# Patient Record
Sex: FEMALE | Race: WHITE | NOT HISPANIC OR LATINO | Employment: OTHER | URBAN - METROPOLITAN AREA
[De-identification: names, ages, dates, MRNs, and addresses within clinical notes are randomized per-mention and may not be internally consistent; named-entity substitution may affect disease eponyms.]

---

## 2023-12-29 ENCOUNTER — APPOINTMENT (OUTPATIENT)
Dept: RADIOLOGY | Facility: HOSPITAL | Age: 73
End: 2023-12-29
Payer: MEDICARE

## 2023-12-29 ENCOUNTER — HOSPITAL ENCOUNTER (EMERGENCY)
Facility: HOSPITAL | Age: 73
Discharge: HOME | End: 2023-12-29
Payer: MEDICARE

## 2023-12-29 VITALS
WEIGHT: 94 LBS | DIASTOLIC BLOOD PRESSURE: 80 MMHG | BODY MASS INDEX: 16.66 KG/M2 | HEIGHT: 63 IN | TEMPERATURE: 97.7 F | RESPIRATION RATE: 20 BRPM | OXYGEN SATURATION: 100 % | HEART RATE: 98 BPM | SYSTOLIC BLOOD PRESSURE: 165 MMHG

## 2023-12-29 DIAGNOSIS — T14.8XXA HEMATOMA: ICD-10-CM

## 2023-12-29 DIAGNOSIS — S69.90XA INJURY OF HAND, UNSPECIFIED LATERALITY, INITIAL ENCOUNTER: Primary | ICD-10-CM

## 2023-12-29 PROCEDURE — 99283 EMERGENCY DEPT VISIT LOW MDM: CPT

## 2023-12-29 PROCEDURE — 73130 X-RAY EXAM OF HAND: CPT | Mod: RT

## 2023-12-29 RX ORDER — ACETAMINOPHEN 325 MG/1
975 TABLET ORAL ONCE
Status: COMPLETED | OUTPATIENT
Start: 2023-12-29 | End: 2023-12-29

## 2023-12-29 RX ADMIN — ACETAMINOPHEN 975 MG: 325 TABLET ORAL at 19:52

## 2023-12-29 ASSESSMENT — PAIN DESCRIPTION - FREQUENCY: FREQUENCY: CONSTANT/CONTINUOUS

## 2023-12-29 ASSESSMENT — PAIN DESCRIPTION - ORIENTATION
ORIENTATION: RIGHT
ORIENTATION: RIGHT

## 2023-12-29 ASSESSMENT — PAIN DESCRIPTION - PAIN TYPE
TYPE: ACUTE PAIN
TYPE: ACUTE PAIN

## 2023-12-29 ASSESSMENT — PAIN SCALES - GENERAL
PAINLEVEL_OUTOF10: 9
PAINLEVEL_OUTOF10: 4

## 2023-12-29 ASSESSMENT — COLUMBIA-SUICIDE SEVERITY RATING SCALE - C-SSRS
1. IN THE PAST MONTH, HAVE YOU WISHED YOU WERE DEAD OR WISHED YOU COULD GO TO SLEEP AND NOT WAKE UP?: NO
6. HAVE YOU EVER DONE ANYTHING, STARTED TO DO ANYTHING, OR PREPARED TO DO ANYTHING TO END YOUR LIFE?: NO
2. HAVE YOU ACTUALLY HAD ANY THOUGHTS OF KILLING YOURSELF?: NO

## 2023-12-29 ASSESSMENT — PAIN - FUNCTIONAL ASSESSMENT
PAIN_FUNCTIONAL_ASSESSMENT: 0-10
PAIN_FUNCTIONAL_ASSESSMENT: 0-10

## 2023-12-29 ASSESSMENT — PAIN DESCRIPTION - LOCATION
LOCATION: HAND
LOCATION: ARM

## 2023-12-29 ASSESSMENT — PAIN DESCRIPTION - DESCRIPTORS: DESCRIPTORS: ACHING

## 2023-12-30 NOTE — ED PROVIDER NOTES
HPI   Chief Complaint   Patient presents with   • Hand Injury     Pt hit her hand on the corner of the table around 1400 today. Pt is on eliquis and her hand has been swelling up, very small leak happening.       73-year-old female presented emergency department the chief complaint of pain to the dorsum of her right hand.  She had it on a table about 4 to 5 hours ago.  She is on Eliquis.  Her hand has swelled up and she has a large hematoma on the back of her hand.  She was concerned this may represent a DVT so presented to the emergency department.  She is been compliant with her Eliquis.  She denies chest pain or shortness of breath.  She denies difficulty with range of motion of upper extremity digits.  Tetanus up-to-date.  No other injury or trauma.  No other complaint.                          [place] Coma Scale Score: 15                  Patient History   No past medical history on file.  No past surgical history on file.  No family history on file.  Social History     Tobacco Use   • Smoking status: Not on file   • Smokeless tobacco: Not on file   Substance Use Topics   • Alcohol use: Not on file   • Drug use: Not on file       Physical Exam   ED Triage Vitals [12/29/23 1846]   Temp Heart Rate Resp BP   36.5 °C (97.7 °F) 77 18 (!) 181/98      SpO2 Temp Source Heart Rate Source Patient Position   100 % Temporal Monitor --      BP Location FiO2 (%)     -- --       Physical Exam  Vitals and nursing note reviewed.   Constitutional:       Appearance: Normal appearance.   HENT:      Head: Normocephalic and atraumatic.      Nose: Nose normal.      Mouth/Throat:      Mouth: Mucous membranes are moist.   Cardiovascular:      Rate and Rhythm: Normal rate and regular rhythm.   Pulmonary:      Effort: Pulmonary effort is normal.      Breath sounds: Normal breath sounds.   Abdominal:      General: Abdomen is flat.      Palpations: Abdomen is soft.   Musculoskeletal:         General: Normal range of motion.      Cervical  back: Normal range of motion.      Comments: Patient with full range of motion of upper extremity digits able to flex and extend at level of wrist   Skin:     General: Skin is warm.      Comments: Large hematoma with overlying ecchymosis to the dorsum of right hand   Neurological:      General: No focal deficit present.      Mental Status: She is alert and oriented to person, place, and time.      Comments: Strength and sensation intact with flexion extension of upper extremity digits   Psychiatric:         Mood and Affect: Mood normal.         ED Course & MDM   Diagnoses as of 12/29/23 2030   Injury of hand, unspecified laterality, initial encounter   Hematoma       Medical Decision Making  I have seen and evaluated this patient.  Physician available for consultation.  Vital signs have been reviewed.  All laboratory and diagnostic imaging is reviewed by myself and interpreted by myself unless otherwise stated.  Additionally imaging is interpreted by radiologist.    Patient with a negative x-ray for bony abnormality.  Overall impression is venous rupture with subsequent hematoma formation.  There is no evidence of a compartment syndrome.  Patient has palpable pulses with intact strength and sensation.  Compression dressing with ice is placed with subsequent decrease in size of hematoma.  Patient instructed to keep compressing and icing over the next several days.  Released in good condition with primary follow-up.    XR hand right 3+ views   Final Result    Marked degenerative changes. No acute bony abnormalities. Marked soft    tissue swelling over the dorsum of the hand.                MACRO:    None          Signed by: Lisa Benito 12/29/2023 7:46 PM    Dictation workstation:   [id number]             Procedure  Procedures     Rodney Mcclellan PA-C  12/29/23 2030

## 2024-01-02 PROBLEM — F41.1 GENERALIZED ANXIETY DISORDER: Status: ACTIVE | Noted: 2024-01-02

## 2024-01-02 PROBLEM — I48.91 ATRIAL FIBRILLATION (MULTI): Status: ACTIVE | Noted: 2024-01-02

## 2024-01-02 PROBLEM — F03.90 DEMENTIA (MULTI): Status: ACTIVE | Noted: 2024-01-02

## 2024-01-02 NOTE — PROGRESS NOTES
Subjective   Patient ID:   Mishel Scanlon is a 73 y.o. female who presents for No chief complaint on file..  HPI  New patient here today to establish care with myself.  Previous PCP: [facility]  Last seen:    ED follow up:  Was in the ED on 12/29/23.  ED note reviewed.  Had hurt her right hand on a table.  She is on Eliquis.  X-rays were negative.  Was diagnosed with a hematoma.    A-fib/HTN:  Taking Eliquis, Lisinopril, Metoprolol.  Cardiology?  BP today is  Denies dizziness, headaches.    Anxiety/Depression/Dementia:  Taking Wellbutrin.  Denies SI/HI.    Health maintenance:  Smoking:  Labs: July 2023. DUE for lipid  DEXA (65+, q 2 years): DUE?  Prevnar 13 (65+):  Pneumovax 23 (65+, 1 year after Prev 13):  Influenza:  Zostavax (60+, 1 time, at pharmacy):    Review of Systems  12 point review of systems negative unless stated above in HPI    There were no vitals filed for this visit.    Physical Exam  General: Alert and oriented, well nourished, no acute distress.  Lungs: Clear to auscultation, non-labored respiration.  Heart: Normal rate, regular rhythm, no murmur, gallop or edema.  Neurologic: Awake, alert, and oriented X3, CN II-XII intact.  Psychiatric: Cooperative, appropriate mood and affect.    Assessment/Plan   Diagnoses and all orders for this visit:  Atrial fibrillation, unspecified type (CMS/HCC)  Dementia, unspecified dementia severity, unspecified dementia type, unspecified whether behavioral, psychotic, or mood disturbance or anxiety (CMS/HCC)  Essential hypertension, benign  Generalized anxiety disorder  Injury of right hand, subsequent encounter  Hematoma

## 2024-01-03 ENCOUNTER — OFFICE VISIT (OUTPATIENT)
Dept: PRIMARY CARE | Facility: CLINIC | Age: 74
End: 2024-01-03
Payer: MEDICARE

## 2024-01-03 VITALS
OXYGEN SATURATION: 98 % | SYSTOLIC BLOOD PRESSURE: 110 MMHG | HEIGHT: 64 IN | HEART RATE: 67 BPM | BODY MASS INDEX: 16.05 KG/M2 | WEIGHT: 94 LBS | DIASTOLIC BLOOD PRESSURE: 68 MMHG

## 2024-01-03 DIAGNOSIS — T14.8XXA HEMATOMA: ICD-10-CM

## 2024-01-03 DIAGNOSIS — F41.1 GENERALIZED ANXIETY DISORDER: ICD-10-CM

## 2024-01-03 DIAGNOSIS — I10 ESSENTIAL HYPERTENSION, BENIGN: ICD-10-CM

## 2024-01-03 DIAGNOSIS — I48.91 ATRIAL FIBRILLATION, UNSPECIFIED TYPE (MULTI): Primary | ICD-10-CM

## 2024-01-03 DIAGNOSIS — S69.91XD INJURY OF RIGHT HAND, SUBSEQUENT ENCOUNTER: ICD-10-CM

## 2024-01-03 DIAGNOSIS — F03.90 DEMENTIA, UNSPECIFIED DEMENTIA SEVERITY, UNSPECIFIED DEMENTIA TYPE, UNSPECIFIED WHETHER BEHAVIORAL, PSYCHOTIC, OR MOOD DISTURBANCE OR ANXIETY (MULTI): ICD-10-CM

## 2024-01-03 PROBLEM — I48.20 CHRONIC ATRIAL FIBRILLATION, UNSPECIFIED (MULTI): Status: ACTIVE | Noted: 2023-07-30

## 2024-01-03 PROBLEM — M54.12 RADICULOPATHY, CERVICAL REGION: Status: ACTIVE | Noted: 2017-10-10

## 2024-01-03 PROBLEM — R26.81 UNSTEADINESS ON FEET: Status: ACTIVE | Noted: 2023-07-30

## 2024-01-03 PROBLEM — M54.2 CERVICALGIA: Status: ACTIVE | Noted: 2017-04-14

## 2024-01-03 PROBLEM — R53.1 WEAKNESS: Status: ACTIVE | Noted: 2023-07-30

## 2024-01-03 PROBLEM — R06.00 DYSPNEA: Status: ACTIVE | Noted: 2024-01-03

## 2024-01-03 PROBLEM — I69.351 HEMIPLEGIA AND HEMIPARESIS FOLLOWING CEREBRAL INFARCTION AFFECTING RIGHT DOMINANT SIDE (MULTI): Status: ACTIVE | Noted: 2023-07-30

## 2024-01-03 PROBLEM — R40.1 CLOUDED CONSCIOUSNESS: Status: ACTIVE | Noted: 2024-01-03

## 2024-01-03 PROBLEM — Q82.0 PRIMARY (CONGENITAL) LYMPHEDEMA: Status: ACTIVE | Noted: 2020-06-01

## 2024-01-03 PROBLEM — R26.89 OTHER ABNORMALITIES OF GAIT AND MOBILITY: Status: ACTIVE | Noted: 2023-07-30

## 2024-01-03 PROBLEM — G89.29 OTHER CHRONIC PAIN: Status: ACTIVE | Noted: 2024-01-03

## 2024-01-03 PROBLEM — R07.89 CHEST DISCOMFORT: Status: ACTIVE | Noted: 2024-01-03

## 2024-01-03 PROBLEM — I36.1 NON-RHEUMATIC TRICUSPID VALVE INSUFFICIENCY: Status: ACTIVE | Noted: 2017-04-19

## 2024-01-03 PROBLEM — G56.13 MEDIAN NEUROPATHY OF BOTH UPPER EXTREMITIES: Status: ACTIVE | Noted: 2017-10-10

## 2024-01-03 PROBLEM — F32.9 MAJOR DEPRESSIVE DISORDER, SINGLE EPISODE, UNSPECIFIED: Status: ACTIVE | Noted: 2024-01-03

## 2024-01-03 PROBLEM — R29.6 REPEATED FALLS: Status: ACTIVE | Noted: 2023-07-30

## 2024-01-03 PROBLEM — S61.219A LACERATION OF FINGER: Status: ACTIVE | Noted: 2024-01-03
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
## 2024-01-03 PROBLEM — F41.9 ANXIETY DISORDER, UNSPECIFIED: Status: ACTIVE | Noted: 2023-07-30

## 2024-01-03 PROBLEM — G93.40 ENCEPHALOPATHY: Status: ACTIVE | Noted: 2023-07-30

## 2024-01-03 PROBLEM — M62.81 MUSCLE WEAKNESS (GENERALIZED): Status: ACTIVE | Noted: 2023-07-30

## 2024-01-03 PROBLEM — F41.9 ANXIETY: Status: ACTIVE | Noted: 2024-01-03

## 2024-01-03 PROBLEM — I82.409 DEEP VENOUS THROMBOSIS (MULTI): Status: ACTIVE | Noted: 2024-01-03

## 2024-01-03 PROBLEM — M99.61 OSSEOUS AND SUBLUXATION STENOSIS OF INTERVERTEBRAL FORAMINA OF CERVICAL REGION: Status: ACTIVE | Noted: 2017-10-10

## 2024-01-03 PROBLEM — I69.318 OTHER SYMPTOMS AND SIGNS INVOLVING COGNITIVE FUNCTIONS FOLLOWING CEREBRAL INFARCTION: Status: ACTIVE | Noted: 2023-07-30

## 2024-01-03 PROBLEM — R07.89 ATYPICAL CHEST PAIN: Status: ACTIVE | Noted: 2024-01-03

## 2024-01-03 PROBLEM — F32.A DEPRESSION, UNSPECIFIED: Status: ACTIVE | Noted: 2023-07-30

## 2024-01-03 PROBLEM — L50.9 URTICARIA: Status: ACTIVE | Noted: 2024-01-03

## 2024-01-03 RX ORDER — TRAMADOL HYDROCHLORIDE 50 MG/1
TABLET ORAL
COMMUNITY

## 2024-01-03 RX ORDER — LUTEIN 6 MG
TABLET ORAL
COMMUNITY
Start: 2007-01-30

## 2024-01-03 RX ORDER — UBIDECARENONE 75 MG
1 CAPSULE ORAL
COMMUNITY

## 2024-01-03 RX ORDER — IBUPROFEN 100 MG/5ML
1000 SUSPENSION, ORAL (FINAL DOSE FORM) ORAL DAILY
COMMUNITY

## 2024-01-03 RX ORDER — APIXABAN 5 MG/1
5 TABLET, FILM COATED ORAL 2 TIMES DAILY
COMMUNITY
Start: 2023-09-19

## 2024-01-03 RX ORDER — LISINOPRIL 20 MG/1
20 TABLET ORAL DAILY
COMMUNITY
Start: 2023-08-29 | End: 2024-03-15 | Stop reason: SDUPTHER

## 2024-01-03 RX ORDER — METOPROLOL TARTRATE 50 MG/1
50 TABLET ORAL 2 TIMES DAILY
COMMUNITY
Start: 2023-09-19

## 2024-01-03 RX ORDER — CHOLECALCIFEROL (VITAMIN D3) 50 MCG
2000 TABLET ORAL DAILY
COMMUNITY
Start: 2009-05-04

## 2024-01-03 ASSESSMENT — PAIN SCALES - GENERAL: PAINLEVEL: 1

## 2024-01-03 ASSESSMENT — ENCOUNTER SYMPTOMS
OCCASIONAL FEELINGS OF UNSTEADINESS: 0
LOSS OF SENSATION IN FEET: 0
DEPRESSION: 0

## 2024-01-03 ASSESSMENT — PATIENT HEALTH QUESTIONNAIRE - PHQ9
SUM OF ALL RESPONSES TO PHQ9 QUESTIONS 1 AND 2: 0
2. FEELING DOWN, DEPRESSED OR HOPELESS: NOT AT ALL
1. LITTLE INTEREST OR PLEASURE IN DOING THINGS: NOT AT ALL

## 2024-01-11 ENCOUNTER — LAB (OUTPATIENT)
Dept: LAB | Facility: LAB | Age: 74
End: 2024-01-11
Payer: MEDICARE

## 2024-01-11 DIAGNOSIS — R41.3 OTHER AMNESIA: Primary | ICD-10-CM

## 2024-01-11 LAB
ALBUMIN SERPL-MCNC: 4.2 G/DL (ref 3.5–5)
ALP BLD-CCNC: 86 U/L (ref 35–125)
ALT SERPL-CCNC: 9 U/L (ref 5–40)
AMMONIA PLAS-SCNC: 23 UMOL/L (ref 12–45)
ANION GAP SERPL CALC-SCNC: 10 MMOL/L
AST SERPL-CCNC: 19 U/L (ref 5–40)
BASOPHILS # BLD AUTO: 0.03 X10*3/UL (ref 0–0.1)
BASOPHILS NFR BLD AUTO: 0.8 %
BILIRUB SERPL-MCNC: 0.6 MG/DL (ref 0.1–1.2)
BUN SERPL-MCNC: 14 MG/DL (ref 8–25)
CALCIUM SERPL-MCNC: 10.1 MG/DL (ref 8.5–10.4)
CHLORIDE SERPL-SCNC: 103 MMOL/L (ref 97–107)
CO2 SERPL-SCNC: 29 MMOL/L (ref 24–31)
CREAT SERPL-MCNC: 0.7 MG/DL (ref 0.4–1.6)
EGFRCR SERPLBLD CKD-EPI 2021: 90 ML/MIN/1.73M*2
EOSINOPHIL # BLD AUTO: 0.01 X10*3/UL (ref 0–0.4)
EOSINOPHIL NFR BLD AUTO: 0.3 %
ERYTHROCYTE [DISTWIDTH] IN BLOOD BY AUTOMATED COUNT: 12.6 % (ref 11.5–14.5)
GLUCOSE SERPL-MCNC: 85 MG/DL (ref 65–99)
HCT VFR BLD AUTO: 39.4 % (ref 36–46)
HGB BLD-MCNC: 12.8 G/DL (ref 12–16)
IMM GRANULOCYTES # BLD AUTO: 0.01 X10*3/UL (ref 0–0.5)
IMM GRANULOCYTES NFR BLD AUTO: 0.3 % (ref 0–0.9)
LYMPHOCYTES # BLD AUTO: 0.94 X10*3/UL (ref 0.8–3)
LYMPHOCYTES NFR BLD AUTO: 24.3 %
MCH RBC QN AUTO: 31.7 PG (ref 26–34)
MCHC RBC AUTO-ENTMCNC: 32.5 G/DL (ref 32–36)
MCV RBC AUTO: 98 FL (ref 80–100)
MONOCYTES # BLD AUTO: 0.4 X10*3/UL (ref 0.05–0.8)
MONOCYTES NFR BLD AUTO: 10.3 %
NEUTROPHILS # BLD AUTO: 2.48 X10*3/UL (ref 1.6–5.5)
NEUTROPHILS NFR BLD AUTO: 64 %
NRBC BLD-RTO: 0 /100 WBCS (ref 0–0)
PLATELET # BLD AUTO: 402 X10*3/UL (ref 150–450)
POTASSIUM SERPL-SCNC: 4.3 MMOL/L (ref 3.4–5.1)
PROT SERPL-MCNC: 6.2 G/DL (ref 5.9–7.9)
RBC # BLD AUTO: 4.04 X10*6/UL (ref 4–5.2)
SODIUM SERPL-SCNC: 142 MMOL/L (ref 133–145)
TSH SERPL DL<=0.05 MIU/L-ACNC: 1.28 MIU/L (ref 0.27–4.2)
VIT B12 SERPL-MCNC: >2000 PG/ML (ref 211–946)
WBC # BLD AUTO: 3.9 X10*3/UL (ref 4.4–11.3)

## 2024-01-11 PROCEDURE — 80053 COMPREHEN METABOLIC PANEL: CPT

## 2024-01-11 PROCEDURE — 86318 IA INFECTIOUS AGENT ANTIBODY: CPT

## 2024-01-11 PROCEDURE — 84443 ASSAY THYROID STIM HORMONE: CPT

## 2024-01-11 PROCEDURE — 82607 VITAMIN B-12: CPT

## 2024-01-11 PROCEDURE — 36415 COLL VENOUS BLD VENIPUNCTURE: CPT

## 2024-01-11 PROCEDURE — 85025 COMPLETE CBC W/AUTO DIFF WBC: CPT

## 2024-01-11 PROCEDURE — 82140 ASSAY OF AMMONIA: CPT

## 2024-01-12 LAB — RPR SER QL: NONREACTIVE

## 2024-02-04 ENCOUNTER — HOSPITAL ENCOUNTER (EMERGENCY)
Facility: HOSPITAL | Age: 74
Discharge: HOME | End: 2024-02-04
Attending: EMERGENCY MEDICINE
Payer: MEDICARE

## 2024-02-04 VITALS
TEMPERATURE: 98.8 F | WEIGHT: 124 LBS | DIASTOLIC BLOOD PRESSURE: 88 MMHG | RESPIRATION RATE: 18 BRPM | OXYGEN SATURATION: 99 % | SYSTOLIC BLOOD PRESSURE: 177 MMHG | HEART RATE: 88 BPM | BODY MASS INDEX: 21.17 KG/M2 | HEIGHT: 64 IN

## 2024-02-04 DIAGNOSIS — N39.0 URINARY TRACT INFECTION WITHOUT HEMATURIA, SITE UNSPECIFIED: Primary | ICD-10-CM

## 2024-02-04 LAB
APPEARANCE UR: ABNORMAL
BILIRUB UR STRIP.AUTO-MCNC: NEGATIVE MG/DL
COLOR UR: ABNORMAL
GLUCOSE UR STRIP.AUTO-MCNC: NORMAL MG/DL
KETONES UR STRIP.AUTO-MCNC: NEGATIVE MG/DL
LEUKOCYTE ESTERASE UR QL STRIP.AUTO: ABNORMAL
NITRITE UR QL STRIP.AUTO: NEGATIVE
PH UR STRIP.AUTO: 6.5 [PH]
PROT UR STRIP.AUTO-MCNC: ABNORMAL MG/DL
RBC # UR STRIP.AUTO: ABNORMAL /UL
RBC #/AREA URNS AUTO: >20 /HPF
SP GR UR STRIP.AUTO: 1.02
UROBILINOGEN UR STRIP.AUTO-MCNC: NORMAL MG/DL
WBC #/AREA URNS AUTO: >50 /HPF

## 2024-02-04 PROCEDURE — 81001 URINALYSIS AUTO W/SCOPE: CPT | Performed by: EMERGENCY MEDICINE

## 2024-02-04 PROCEDURE — 2500000001 HC RX 250 WO HCPCS SELF ADMINISTERED DRUGS (ALT 637 FOR MEDICARE OP): Performed by: EMERGENCY MEDICINE

## 2024-02-04 PROCEDURE — 99283 EMERGENCY DEPT VISIT LOW MDM: CPT | Performed by: EMERGENCY MEDICINE

## 2024-02-04 RX ORDER — CEFUROXIME AXETIL 500 MG/1
500 TABLET ORAL 2 TIMES DAILY
Qty: 20 TABLET | Refills: 0 | Status: SHIPPED | OUTPATIENT
Start: 2024-02-04 | End: 2024-02-14

## 2024-02-04 RX ORDER — CEPHALEXIN 500 MG/1
500 CAPSULE ORAL ONCE
Status: COMPLETED | OUTPATIENT
Start: 2024-02-04 | End: 2024-02-04

## 2024-02-04 RX ORDER — PHENAZOPYRIDINE HYDROCHLORIDE 200 MG/1
200 TABLET, FILM COATED ORAL 3 TIMES DAILY
Qty: 6 TABLET | Refills: 0 | Status: SHIPPED | OUTPATIENT
Start: 2024-02-04 | End: 2024-02-06

## 2024-02-04 RX ADMIN — CEPHALEXIN 500 MG: 500 CAPSULE ORAL at 19:08

## 2024-02-04 ASSESSMENT — LIFESTYLE VARIABLES
EVER HAD A DRINK FIRST THING IN THE MORNING TO STEADY YOUR NERVES TO GET RID OF A HANGOVER: NO
HAVE PEOPLE ANNOYED YOU BY CRITICIZING YOUR DRINKING: NO
EVER FELT BAD OR GUILTY ABOUT YOUR DRINKING: NO
HAVE YOU EVER FELT YOU SHOULD CUT DOWN ON YOUR DRINKING: NO

## 2024-02-04 ASSESSMENT — PAIN DESCRIPTION - ONSET: ONSET: ONGOING

## 2024-02-04 ASSESSMENT — PAIN DESCRIPTION - FREQUENCY: FREQUENCY: CONSTANT/CONTINUOUS

## 2024-02-04 ASSESSMENT — COLUMBIA-SUICIDE SEVERITY RATING SCALE - C-SSRS
2. HAVE YOU ACTUALLY HAD ANY THOUGHTS OF KILLING YOURSELF?: NO
1. IN THE PAST MONTH, HAVE YOU WISHED YOU WERE DEAD OR WISHED YOU COULD GO TO SLEEP AND NOT WAKE UP?: NO
6. HAVE YOU EVER DONE ANYTHING, STARTED TO DO ANYTHING, OR PREPARED TO DO ANYTHING TO END YOUR LIFE?: NO

## 2024-02-04 ASSESSMENT — PAIN DESCRIPTION - PROGRESSION: CLINICAL_PROGRESSION: NOT CHANGED

## 2024-02-04 ASSESSMENT — PAIN SCALES - GENERAL: PAINLEVEL_OUTOF10: 10 - WORST POSSIBLE PAIN

## 2024-02-04 ASSESSMENT — PAIN DESCRIPTION - PAIN TYPE: TYPE: ACUTE PAIN

## 2024-02-04 ASSESSMENT — PAIN DESCRIPTION - LOCATION: LOCATION: BACK

## 2024-02-04 ASSESSMENT — PAIN - FUNCTIONAL ASSESSMENT: PAIN_FUNCTIONAL_ASSESSMENT: 0-10

## 2024-02-04 ASSESSMENT — PAIN DESCRIPTION - DESCRIPTORS: DESCRIPTORS: ACHING;THROBBING;SHOOTING

## 2024-02-04 ASSESSMENT — PAIN DESCRIPTION - ORIENTATION: ORIENTATION: LOWER;UPPER

## 2024-02-05 NOTE — ED PROVIDER NOTES
HPI   Chief Complaint   Patient presents with    Blood in Urine     Patient states yesterday she started with urinary frequency with a little blood then today there is more blood.       Patient presents the emergency department today with complaints of having frequency, dysuria, and also urgency.  The patient states that she is seeing some blood in her urine as well.  Patient states that the symptoms been going on for the last 2 days, then getting steadily worse.  The patient is not feeling weak or dizzy.  Patient states there is no abdominal pain.  Patient is not having nausea or vomiting.  The patient states that she is getting tired of going to the bathroom on such a regular basis.  The patient was brought in by her son because she had been complaining about the symptoms that she was having at home.  Patient denies chest pain or shortness of breath.  Patient denies any other associated symptoms      [place] Coma Scale Score: 15                  Patient History   History reviewed. No pertinent past medical history.  History reviewed. No pertinent surgical history.  No family history on file.  Social History     Tobacco Use    Smoking status: Never     Passive exposure: Never    Smokeless tobacco: Never   Vaping Use    Vaping Use: Never used   Substance Use Topics    Alcohol use: Never    Drug use: Never       Physical Exam   ED Triage Vitals   Temperature Heart Rate Respirations BP   02/04/24 1706 02/04/24 1706 02/04/24 1706 02/04/24 1706   37.1 °C (98.8 °F) 97 18 (!) 191/90      Pulse Ox Temp Source Heart Rate Source Patient Position   02/04/24 1706 02/04/24 1706 02/04/24 1917 02/04/24 1706   98 % Tympanic Monitor Sitting      BP Location FiO2 (%)     02/04/24 1706 --     Left arm        Physical Exam  Vitals and nursing note reviewed.   Constitutional:       General: She is not in acute distress.     Appearance: She is well-developed.   HENT:      Head: Normocephalic and atraumatic.   Eyes:       Conjunctiva/sclera: Conjunctivae normal.   Cardiovascular:      Rate and Rhythm: Normal rate and regular rhythm.      Heart sounds: No murmur heard.  Pulmonary:      Effort: Pulmonary effort is normal. No respiratory distress.      Breath sounds: Normal breath sounds.   Abdominal:      Palpations: Abdomen is soft.      Tenderness: There is no abdominal tenderness.   Musculoskeletal:         General: No swelling.      Cervical back: Neck supple.   Skin:     General: Skin is warm and dry.   Neurological:      Mental Status: She is alert.         ED Course & MDM   ED Course as of 02/04/24 2203   Sun Feb 04, 2024 1911 I did go over the test results with the patient, and she is aware the fact that there does appear to be urinary tract infection, the patient will be put on antibiotics, I am going to write for antibiotics for home.  Patient will follow with primary care doctor this week [FR]      ED Course User Index  [FR] Kelby Reyes DO         Diagnoses as of 02/04/24 2203   Urinary tract infection without hematuria, site unspecified       Medical Decision Making  After my initial evaluation, the patient symptoms do seem to be consistent with a urinary tract infection.  The patient will have a urine sent, and I am going to start the patient on Pyridium.        Procedure  Procedures     Kelby Reyes DO  02/04/24 2203

## 2024-03-15 DIAGNOSIS — I10 HYPERTENSION, UNSPECIFIED TYPE: Primary | ICD-10-CM

## 2024-03-15 RX ORDER — LISINOPRIL 20 MG/1
20 TABLET ORAL
Qty: 30 TABLET | Refills: 5 | Status: SHIPPED | OUTPATIENT
Start: 2024-03-15

## 2024-03-19 ENCOUNTER — OFFICE VISIT (OUTPATIENT)
Dept: NEUROSURGERY | Facility: CLINIC | Age: 74
End: 2024-03-19
Payer: MEDICARE

## 2024-03-19 VITALS
DIASTOLIC BLOOD PRESSURE: 86 MMHG | BODY MASS INDEX: 19.63 KG/M2 | HEIGHT: 64 IN | HEART RATE: 72 BPM | SYSTOLIC BLOOD PRESSURE: 140 MMHG | RESPIRATION RATE: 18 BRPM | WEIGHT: 115 LBS

## 2024-03-19 DIAGNOSIS — M54.2 CERVICALGIA: Primary | ICD-10-CM

## 2024-03-19 PROCEDURE — 3077F SYST BP >= 140 MM HG: CPT | Performed by: NEUROLOGICAL SURGERY

## 2024-03-19 PROCEDURE — 99202 OFFICE O/P NEW SF 15 MIN: CPT | Performed by: NEUROLOGICAL SURGERY

## 2024-03-19 PROCEDURE — 1157F ADVNC CARE PLAN IN RCRD: CPT | Performed by: NEUROLOGICAL SURGERY

## 2024-03-19 PROCEDURE — 1125F AMNT PAIN NOTED PAIN PRSNT: CPT | Performed by: NEUROLOGICAL SURGERY

## 2024-03-19 PROCEDURE — 1036F TOBACCO NON-USER: CPT | Performed by: NEUROLOGICAL SURGERY

## 2024-03-19 PROCEDURE — 3079F DIAST BP 80-89 MM HG: CPT | Performed by: NEUROLOGICAL SURGERY

## 2024-03-19 PROCEDURE — 1159F MED LIST DOCD IN RCRD: CPT | Performed by: NEUROLOGICAL SURGERY

## 2024-03-19 ASSESSMENT — ENCOUNTER SYMPTOMS
FREQUENCY: 1
NECK PAIN: 1
CONFUSION: 1
APPETITE CHANGE: 1
DIZZINESS: 1
BRUISES/BLEEDS EASILY: 1
EYES NEGATIVE: 1
ENDOCRINE NEGATIVE: 1
GASTROINTESTINAL NEGATIVE: 1
ALLERGIC/IMMUNOLOGIC NEGATIVE: 1
WEAKNESS: 1
RESPIRATORY NEGATIVE: 1

## 2024-03-19 ASSESSMENT — PAIN SCALES - GENERAL: PAINLEVEL: 10-WORST PAIN EVER

## 2024-03-19 NOTE — PROGRESS NOTES
"Here today with burning pain in the neck and going down the arms to the wrist. This has been for 4 months, Has has PT and injections.    73-year-old woman presents for pain throughout her body.  She had had problems with pain for 20 years.  Over that time, she has tried pain management, physical therapy, acupuncture, and many other treatments.      Review of Systems   Constitutional:  Positive for appetite change.   HENT:  Positive for hearing loss.    Eyes: Negative.    Respiratory: Negative.     Cardiovascular:  Positive for leg swelling.   Gastrointestinal: Negative.    Endocrine: Negative.    Genitourinary:  Positive for frequency and urgency.   Musculoskeletal:  Positive for neck pain.   Skin: Negative.    Allergic/Immunologic: Negative.    Neurological:  Positive for dizziness and weakness.   Hematological:  Bruises/bleeds easily.   Psychiatric/Behavioral:  Positive for confusion.        Visit Vitals  /86   Pulse 72   Resp 18   Ht 1.626 m (5' 4\")   Wt 52.2 kg (115 lb)   BMI 19.74 kg/m²   OB Status Unknown   Smoking Status Never   BSA 1.54 m²           Current Outpatient Medications:     ascorbic acid (Vitamin C) 1,000 mg tablet, Take 1 tablet (1,000 mg) by mouth once daily., Disp: , Rfl:     cholecalciferol (Vitamin D-3) 50 MCG (2000 UT) tablet, Take 1 tablet (2,000 Units) by mouth once daily., Disp: , Rfl:     cyanocobalamin (Vitamin B-12) 500 mcg tablet, Take 1 tablet (500 mcg) by mouth once daily., Disp: , Rfl:     Eliquis 5 mg tablet, Take 1 tablet (5 mg) by mouth twice a day., Disp: , Rfl:     lisinopril 20 mg tablet, TAKE ONE TABLET BY MOUTH EVERY DAY IN THE MORNING, Disp: 30 tablet, Rfl: 5    metoprolol tartrate (Lopressor) 50 mg tablet, Take 1 tablet by mouth 2 times a day., Disp: , Rfl:     traMADol (Ultram) 50 mg tablet, Take 1 tablet by mouth two times a day as needed for pain for up to 30 days. Do not start before December 17, 2023., Disp: , Rfl:     vitamin E acid succinate (vitamin E " succinate) 268 mg (400 unit) tablet, Take by mouth., Disp: , Rfl:       Objective   Neurological Exam    On physical exam, the patient is alert and interactive.  She has a dextro scoliosis.  She has diffuse mild 4+ out of 5 weakness.  There is no evidence of hypertonia or hyperreflexia.    The patient recently underwent an MRI of her cervical spine but did not bring this with her for review.    At this time, is unclear whether or not the patient would benefit from any surgical intervention.  Will need to see the images from her MRI to determine if there is any nerve root or spinal cord compression that explains her symptoms before making any final decisions.  I will contact her once I see the results of her scans.

## 2024-03-29 ENCOUNTER — DOCUMENTATION (OUTPATIENT)
Dept: NEUROSURGERY | Facility: HOSPITAL | Age: 74
End: 2024-03-29
Payer: MEDICARE

## 2024-03-29 NOTE — PROGRESS NOTES
MRI shows multiple areas of moderate stenosis.  This does not explain her whole body pain.  As such, I do not believe she would benefit from surgical decompression.

## 2024-08-31 ENCOUNTER — APPOINTMENT (OUTPATIENT)
Dept: RADIOLOGY | Facility: HOSPITAL | Age: 74
End: 2024-08-31
Payer: MEDICARE

## 2024-08-31 ENCOUNTER — HOSPITAL ENCOUNTER (EMERGENCY)
Facility: HOSPITAL | Age: 74
Discharge: HOME | End: 2024-09-01
Attending: STUDENT IN AN ORGANIZED HEALTH CARE EDUCATION/TRAINING PROGRAM
Payer: MEDICARE

## 2024-08-31 DIAGNOSIS — J90 PLEURAL EFFUSION: Primary | ICD-10-CM

## 2024-08-31 DIAGNOSIS — K59.00 ACUTE CONSTIPATION: ICD-10-CM

## 2024-08-31 DIAGNOSIS — R30.0 DYSURIA: ICD-10-CM

## 2024-08-31 DIAGNOSIS — K56.41 FECAL IMPACTION IN RECTUM (MULTI): ICD-10-CM

## 2024-08-31 LAB
ALBUMIN SERPL-MCNC: 3.7 G/DL (ref 3.5–5)
ALP BLD-CCNC: 102 U/L (ref 35–125)
ALT SERPL-CCNC: 15 U/L (ref 5–40)
ANION GAP SERPL CALC-SCNC: 12 MMOL/L
APPEARANCE UR: CLEAR
AST SERPL-CCNC: 31 U/L (ref 5–40)
BASOPHILS # BLD AUTO: 0.03 X10*3/UL (ref 0–0.1)
BASOPHILS NFR BLD AUTO: 0.9 %
BILIRUB SERPL-MCNC: 0.4 MG/DL (ref 0.1–1.2)
BILIRUB UR STRIP.AUTO-MCNC: NEGATIVE MG/DL
BUN SERPL-MCNC: 14 MG/DL (ref 8–25)
CALCIUM SERPL-MCNC: 9.7 MG/DL (ref 8.5–10.4)
CHLORIDE SERPL-SCNC: 95 MMOL/L (ref 97–107)
CO2 SERPL-SCNC: 25 MMOL/L (ref 24–31)
COLOR UR: ABNORMAL
CREAT SERPL-MCNC: 0.6 MG/DL (ref 0.4–1.6)
EGFRCR SERPLBLD CKD-EPI 2021: >90 ML/MIN/1.73M*2
EOSINOPHIL # BLD AUTO: 0.04 X10*3/UL (ref 0–0.4)
EOSINOPHIL NFR BLD AUTO: 1.3 %
ERYTHROCYTE [DISTWIDTH] IN BLOOD BY AUTOMATED COUNT: 12.8 % (ref 11.5–14.5)
GLUCOSE SERPL-MCNC: 99 MG/DL (ref 65–99)
GLUCOSE UR STRIP.AUTO-MCNC: NORMAL MG/DL
HCT VFR BLD AUTO: 39.5 % (ref 36–46)
HGB BLD-MCNC: 12.8 G/DL (ref 12–16)
IMM GRANULOCYTES # BLD AUTO: 0 X10*3/UL (ref 0–0.5)
IMM GRANULOCYTES NFR BLD AUTO: 0 % (ref 0–0.9)
KETONES UR STRIP.AUTO-MCNC: ABNORMAL MG/DL
LEUKOCYTE ESTERASE UR QL STRIP.AUTO: NEGATIVE
LIPASE SERPL-CCNC: 18 U/L (ref 16–63)
LYMPHOCYTES # BLD AUTO: 1 X10*3/UL (ref 0.8–3)
LYMPHOCYTES NFR BLD AUTO: 31.4 %
MCH RBC QN AUTO: 30.5 PG (ref 26–34)
MCHC RBC AUTO-ENTMCNC: 32.4 G/DL (ref 32–36)
MCV RBC AUTO: 94 FL (ref 80–100)
MONOCYTES # BLD AUTO: 0.55 X10*3/UL (ref 0.05–0.8)
MONOCYTES NFR BLD AUTO: 17.3 %
NEUTROPHILS # BLD AUTO: 1.56 X10*3/UL (ref 1.6–5.5)
NEUTROPHILS NFR BLD AUTO: 49.1 %
NITRITE UR QL STRIP.AUTO: NEGATIVE
NRBC BLD-RTO: 0 /100 WBCS (ref 0–0)
PH UR STRIP.AUTO: 6 [PH]
PLATELET # BLD AUTO: 272 X10*3/UL (ref 150–450)
POTASSIUM SERPL-SCNC: 3.8 MMOL/L (ref 3.4–5.1)
PROT SERPL-MCNC: 6.8 G/DL (ref 5.9–7.9)
PROT UR STRIP.AUTO-MCNC: NEGATIVE MG/DL
RBC # BLD AUTO: 4.2 X10*6/UL (ref 4–5.2)
RBC # UR STRIP.AUTO: NEGATIVE /UL
SARS-COV-2 RNA RESP QL NAA+PROBE: NOT DETECTED
SODIUM SERPL-SCNC: 132 MMOL/L (ref 133–145)
SP GR UR STRIP.AUTO: 1.01
UROBILINOGEN UR STRIP.AUTO-MCNC: NORMAL MG/DL
WBC # BLD AUTO: 3.2 X10*3/UL (ref 4.4–11.3)

## 2024-08-31 PROCEDURE — 74177 CT ABD & PELVIS W/CONTRAST: CPT

## 2024-08-31 PROCEDURE — 99285 EMERGENCY DEPT VISIT HI MDM: CPT | Mod: 25

## 2024-08-31 PROCEDURE — 80053 COMPREHEN METABOLIC PANEL: CPT | Performed by: PHYSICIAN ASSISTANT

## 2024-08-31 PROCEDURE — 83690 ASSAY OF LIPASE: CPT | Performed by: PHYSICIAN ASSISTANT

## 2024-08-31 PROCEDURE — 2550000001 HC RX 255 CONTRASTS

## 2024-08-31 PROCEDURE — 96374 THER/PROPH/DIAG INJ IV PUSH: CPT | Mod: 59

## 2024-08-31 PROCEDURE — 81003 URINALYSIS AUTO W/O SCOPE: CPT | Performed by: PHYSICIAN ASSISTANT

## 2024-08-31 PROCEDURE — 71250 CT THORAX DX C-: CPT

## 2024-08-31 PROCEDURE — 71250 CT THORAX DX C-: CPT | Performed by: RADIOLOGY

## 2024-08-31 PROCEDURE — 87635 SARS-COV-2 COVID-19 AMP PRB: CPT

## 2024-08-31 PROCEDURE — 2500000004 HC RX 250 GENERAL PHARMACY W/ HCPCS (ALT 636 FOR OP/ED): Performed by: PHYSICIAN ASSISTANT

## 2024-08-31 PROCEDURE — 74177 CT ABD & PELVIS W/CONTRAST: CPT | Performed by: RADIOLOGY

## 2024-08-31 PROCEDURE — 36415 COLL VENOUS BLD VENIPUNCTURE: CPT | Performed by: PHYSICIAN ASSISTANT

## 2024-08-31 PROCEDURE — 96361 HYDRATE IV INFUSION ADD-ON: CPT

## 2024-08-31 PROCEDURE — 85025 COMPLETE CBC W/AUTO DIFF WBC: CPT | Performed by: PHYSICIAN ASSISTANT

## 2024-08-31 PROCEDURE — 2500000004 HC RX 250 GENERAL PHARMACY W/ HCPCS (ALT 636 FOR OP/ED)

## 2024-08-31 RX ORDER — ONDANSETRON HYDROCHLORIDE 2 MG/ML
4 INJECTION, SOLUTION INTRAVENOUS ONCE
Status: COMPLETED | OUTPATIENT
Start: 2024-08-31 | End: 2024-08-31

## 2024-08-31 RX ORDER — ACETAMINOPHEN 325 MG/1
975 TABLET ORAL ONCE
Status: COMPLETED | OUTPATIENT
Start: 2024-08-31 | End: 2024-08-31

## 2024-08-31 RX ADMIN — ACETAMINOPHEN 975 MG: 325 TABLET ORAL at 17:10

## 2024-08-31 RX ADMIN — ONDANSETRON 4 MG: 2 INJECTION INTRAMUSCULAR; INTRAVENOUS at 17:25

## 2024-08-31 RX ADMIN — IOHEXOL 75 ML: 350 INJECTION, SOLUTION INTRAVENOUS at 17:28

## 2024-08-31 RX ADMIN — SODIUM CHLORIDE 1000 ML: 900 INJECTION, SOLUTION INTRAVENOUS at 17:25

## 2024-08-31 ASSESSMENT — COLUMBIA-SUICIDE SEVERITY RATING SCALE - C-SSRS
1. IN THE PAST MONTH, HAVE YOU WISHED YOU WERE DEAD OR WISHED YOU COULD GO TO SLEEP AND NOT WAKE UP?: NO
2. HAVE YOU ACTUALLY HAD ANY THOUGHTS OF KILLING YOURSELF?: NO
6. HAVE YOU EVER DONE ANYTHING, STARTED TO DO ANYTHING, OR PREPARED TO DO ANYTHING TO END YOUR LIFE?: NO

## 2024-08-31 ASSESSMENT — PAIN SCALES - GENERAL
PAINLEVEL_OUTOF10: 7
PAINLEVEL_OUTOF10: 6

## 2024-08-31 ASSESSMENT — LIFESTYLE VARIABLES
TOTAL SCORE: 0
HAVE PEOPLE ANNOYED YOU BY CRITICIZING YOUR DRINKING: NO
HAVE YOU EVER FELT YOU SHOULD CUT DOWN ON YOUR DRINKING: NO
EVER FELT BAD OR GUILTY ABOUT YOUR DRINKING: NO
EVER HAD A DRINK FIRST THING IN THE MORNING TO STEADY YOUR NERVES TO GET RID OF A HANGOVER: NO

## 2024-08-31 ASSESSMENT — PAIN - FUNCTIONAL ASSESSMENT: PAIN_FUNCTIONAL_ASSESSMENT: 0-10

## 2024-08-31 ASSESSMENT — PAIN DESCRIPTION - LOCATION: LOCATION: BACK

## 2024-08-31 NOTE — ED PROVIDER NOTES
HPI   Chief Complaint   Patient presents with    Difficulty Urinating       Patient is a 74-year-old female who presents emergency department for evaluation of urinary complaints and some generalized fatigue.  Patient states that about 4 days ago she start developing symptoms of urinary frequency, dysuria and feelings of incomplete emptying of bladder.  She went to an urgent care and was prescribed Keflex for urinary tract infection.  She since starting to take the medication she has had not had any significant improvement of her symptoms.  She states last night she had some shaking and felt feverish and chilled.  She is felt rundown.  Intermittently she will feel somewhat confused, but has no confusion or altered mental status at this time.  She presents with neighbor who checks on her frequently who states that she is currently at her mental baseline.  She denies any difficulty breathing or chest pain.  She denies any lightheadedness or dizziness.  She notes that she is also been dealing with some constipation lately and is has not had a full bowel movement in the last week.  She has had some intermittent abdominal pain and cramping because of this.  She states that she is relatively healthy individual and only takes medications for high blood pressure.      History provided by:  Patient   used: No            Patient History   Past Medical History:   Diagnosis Date    Breast cancer (Multi)     Neck pain      Past Surgical History:   Procedure Laterality Date    BREAST SURGERY      KIDNEY SURGERY       Family History   Problem Relation Name Age of Onset    Breast cancer Mother      Heart disease Father       Social History     Tobacco Use    Smoking status: Never     Passive exposure: Never    Smokeless tobacco: Never   Vaping Use    Vaping status: Never Used   Substance Use Topics    Alcohol use: Never    Drug use: Never       Physical Exam   ED Triage Vitals [08/31/24 1555]   Temperature Heart  Rate Respirations BP   37.1 °C (98.8 °F) 87 16 (!) 164/110      Pulse Ox Temp Source Heart Rate Source Patient Position   96 % Temporal Monitor --      BP Location FiO2 (%)     -- --       Physical Exam  Constitutional:       Appearance: Normal appearance.   Eyes:      Extraocular Movements: Extraocular movements intact.      Pupils: Pupils are equal, round, and reactive to light.   Cardiovascular:      Rate and Rhythm: Normal rate and regular rhythm.   Pulmonary:      Effort: Pulmonary effort is normal.      Breath sounds: Normal breath sounds.   Abdominal:      General: Abdomen is flat.      Palpations: Abdomen is soft.      Tenderness: There is no abdominal tenderness.   Musculoskeletal:         General: Normal range of motion.   Skin:     General: Skin is warm and dry.   Neurological:      General: No focal deficit present.      Mental Status: She is alert and oriented to person, place, and time.           ED Course & MDM   ED Course as of 09/01/24 1714   Sat Aug 31, 2024   1614 Spoke with gastroenterology Dr. Mcrae. [AF]   2347 Spoke with pulmonology Dr. Tariq.   [AF]      ED Course User Index  [AF] Lea Deleon PA-C         Diagnoses as of 09/01/24 1714   Pleural effusion   Dysuria   Acute constipation   Fecal impaction in rectum (Multi)                 No data recorded     [place] Coma Scale Score: 15 (08/31/24 1710 : Yaa Perez LPN)                           Medical Decision Making  Patient is a 74-year-old female presents emergency department for evaluation of urinary tract infection is not improving with dysuria, frequency, feelings of incomplete bladder emptying, constipation, and generalized fatigue.    Lab work done today included CMP, CBC, lipase, urinalysis.  Lab work with borderline hyponatremia and borderline hyponatremia otherwise unremarkable.    Scans done today were interpreted/confirmed by radiologist and also interpreted by me which included CT abdomen/pelvis with contrast and CT  chest without contrast.    Medications given at today's visit include IV fluids, IV Zofran, p.o. Tylenol    I saw this patient in conjunction with Dr. Monae.  Patient remained stable in the emergency department.  CT abdomen pelvis today shows evidence of significant amount of retained stool and constipation with concern for possible developing state coral colitis.  Incidentally on imaging studies also she was noted to have a left pleural effusion.  When discussing this pleural effusion, patient has no history of this, but states that intermittently she will have some exertional dyspnea, but is not feeling short of breath at this time.  She notes that about 7 years ago she had breast cancer, but has been her mission ever since and notes that her only medical history is hypertension.  She has no evidence of urinary tract infection today, and I suspect her urinary symptoms likely more related to severe constipation and fecal impaction.  Given the CT reading of possible proctitis I did consult with gastroenterology Dr. Mcrae to discuss case.  She elicited concerns about a pleural effusion incidentally found and I will speak with pulmonology on-call as well, but as far as the proctitis and constipation, after discussing case she feels likely that patient's imaging is more consistent with a fecal impaction and not truly a proctitis and recommends a disimpaction and continued enemas and stool softeners to help with symptoms.  As for her pleural effusion I did image the chest which showed a large left pleural effusion with some compressive atelectasis in the left lower lobe recommending nonemergent follow-up.  Given the incidental finding of pleural effusion I did speak with pulmonology Dr. Tariq who was able to review images and feels that patient does not meet any admission criteria for this pleural effusion, but will likely need close follow-up outpatient for sampling of the fluid and further evaluation to determine  source of pleural effusion.  He feels that it safe for patient to be discharged especially given no infectious type symptoms with patient oxygenating well on room air with no distress.  Patient was educated about her pleural effusion and will be discharged to follow-up closely with pulmonology outpatient.  She is agreeable to close follow-up with them as well as her primary care provider.  As far as however constipation I performed a digital disimpaction without complication.  Patient feeling improved and given multiple enemas.  She will be discharged home on stool softeners to help with symptoms.  She is agreeable to plan and discharge at this time.  Emergent pathologies were considered for this patient, although I have low suspicion for anything acutely emergent given patient's clinical presentation, history, physical exam, stable vital signs.  Discharging patient home is reasonable plan of care for outpatient management.    All labs, imaging, and diagnostic studies were reviewed by me and patient was counseled on clinical impression, expectations, and plan.  Patient was educated to follow-up with PCP in the following 1-2 days.  All questions from patient were answered. They elicited understanding and were agreeable to course of treatment.  Patient was discharged in stable condition and given strict return precautions.    Prescriptions given on discharge: PO MiraLAX, Vivian-Colace    ** Disclaimer:  Parts of this document were written utilizing a voice to text dictation software.  Note may contain minor transcription or typographical errors that were inadvertently transcribed by the computer software.        Procedure  Procedures     Lea Deleon PA-C  09/01/24 3698

## 2024-08-31 NOTE — ED TRIAGE NOTES
Pt has multiple complaints. Pt seen at urgent care 3 days ago for urinary complaints. And placeced on antibiotics. Pt states she is drowsy and doesn't feel well.

## 2024-08-31 NOTE — ED TRIAGE NOTES
TRIAGE NOTE   I saw the patient as the Clinician in Triage and performed a brief history and physical exam, established acuity, and ordered appropriate tests to develop basic plan of care. Patient will be seen by an DENISE, resident and/or physician who will independently evaluate the patient. Please see subsequent provider notes for further details and disposition.     Brief HPI: In brief, Mishel Scanlon is a 74 y.o. female that presents for malaise fatigue, concern for UTI and confusion ongoing over the last several days.  Accompanied here by neighbor who states that she has noticed that she has been a little bit out of it recently.  Her son visits her but she lives alone and the neighbor was concerned that due to being seen at urgent care a few days ago being diagnosed with UTI on antibiotics it is not getting better and prompted the visit to the ED today.  Focused Physical exam:   Well-nourished and developed elderly female sitting upright in chair not in acute distress.  Heart is regular.  Lungs are clear.  Abdomen soft nontender.  Plan/MDM:   Metabolic workup initiated including CBC, metabolic panel, lipase, urine.  Please see subsequent provider note for further details and disposition

## 2024-09-01 VITALS
SYSTOLIC BLOOD PRESSURE: 153 MMHG | HEART RATE: 85 BPM | OXYGEN SATURATION: 94 % | HEIGHT: 65 IN | TEMPERATURE: 98.8 F | RESPIRATION RATE: 16 BRPM | WEIGHT: 90 LBS | BODY MASS INDEX: 14.99 KG/M2 | DIASTOLIC BLOOD PRESSURE: 94 MMHG

## 2024-09-01 LAB — HOLD SPECIMEN: NORMAL

## 2024-09-01 RX ORDER — POLYETHYLENE GLYCOL 3350 17 G/17G
17 POWDER, FOR SOLUTION ORAL DAILY
Qty: 7 PACKET | Refills: 0 | Status: SHIPPED | OUTPATIENT
Start: 2024-09-01 | End: 2024-09-08

## 2024-09-01 RX ORDER — AMOXICILLIN 250 MG
1 CAPSULE ORAL DAILY
Qty: 20 TABLET | Refills: 0 | Status: SHIPPED | OUTPATIENT
Start: 2024-09-01 | End: 2024-09-21

## 2024-09-01 NOTE — ED PROVIDER NOTES
Patient was seen by both myself and advanced practitioner.  I personally saw the patient and made/approved the management plan and take responsibility for the patient management     Patient is a 74-year-old female that presents emergency department for evaluation of difficulty urinating.  Patient reports that for last several days she has been had increasing urinary frequency, dysuria as well as feelings that she is incomplete emptying her bladder.  She was diagnosed with a urinary tract infection and presented Keflex.  She states has been taking it however the symptoms and not been improving.  She did have some mild chills and felt rundown yesterday however denies chest pain, cough, congestion, nausea, vomiting.  She denies any change in bowel habits.  Patient's neighbor who does frequently check on her states that she is at her mental baseline however has been having some increasing difficulty breathing on exertion.  Patient denies any shortness of breath at rest or chest pain.    On exam patient awake, alert and oriented.  She is frail appearing but in no obvious distress.  She is hemodynamically stable with normal oxygen saturation, is hypertensive with a blood pressure 164/110.  Abdomen mildly tender in the lower abdomen without rigidity or guarding.  No peripheral pitting edema.  Blood work ordered including CBC, CMP, lipase, urinalysis.  Blood work was unremarkable including slight decrease of white count of 3.2 however normal electrolytes, normal kidney function.  Urinalysis shows no evidence of urinary tract infection at this time.  CT scan of the ab pelvis did show a large stool burden with possibility of mild stercoral proctitis as well as I did visualize partially a left-sided pleural effusion.  Given the large left-sided pleural effusion present that was incompletely visualized CT scan of the chest was also ordered.  CT scan of the chest again shows the left-sided large pleural effusion with compressive  atelectasis of the left lower lobe however no evidence of pneumothorax.  It is unclear the patient source of pleural effusion at this time as she has no shortness of breath at rest and is hemodynamically stable.  It is felt patient's dysuria and urinary frequency is likely related to his heart fecal impaction we will plan for manual disimpaction, enema.  Advance practitioner did discuss case with gastroenterology who did agree with plan for enema and fecal disimpaction however did not recommend admission or antibiotics at this time.  Advance practitioner also discussed case with pulmonology given the large pleural effusion and they will follow-up with patient as an outpatient.    I independently interpreted the following study(s) CT scan of the chest without contrast, CT scan of the abdomen pelvis with IV contrast which show CT scan of the abdomen pelvis with IV contrast did show left-sided pleural effusion with some left lower lobe collapse however there was also significant stool burden with constipation and possible mild stercoral proctitis.  There is no evidence of bowel obstruction, perforation or abscess.  CT scan of the chest did show the full large left-sided pleural effusion with compressive atelectasis of the left lower lobe as well as some bandlike consolidation in the left upper lobe and lingula.    ED Course as of 09/01/24 1403   Sat Aug 31, 2024   1614 Spoke with gastroenterology Dr. Mcrae. [AF]   6957 Spoke with pulmonology Dr. Tariq.   [AF]      ED Course User Index  [AF] Lea Deleon PA-C         Diagnoses as of 09/01/24 1403   Pleural effusion   Dysuria   Acute constipation   Fecal impaction in rectum (Multi)          Rodney Monae,   09/01/24 1409

## 2024-09-01 NOTE — DISCHARGE INSTRUCTIONS
Please follow-up closely with your primary care provider in the following week.  Increase fluids to help with constipation.  Use fiber bulking agent and utilize stool softeners as prescribed.

## 2025-03-21 ENCOUNTER — APPOINTMENT (OUTPATIENT)
Dept: CARDIOLOGY | Facility: HOSPITAL | Age: 75
End: 2025-03-21
Payer: MEDICARE

## 2025-03-21 ENCOUNTER — APPOINTMENT (OUTPATIENT)
Dept: RADIOLOGY | Facility: HOSPITAL | Age: 75
End: 2025-03-21
Payer: MEDICARE

## 2025-03-21 ENCOUNTER — HOSPITAL ENCOUNTER (INPATIENT)
Facility: HOSPITAL | Age: 75
End: 2025-03-21
Attending: EMERGENCY MEDICINE | Admitting: INTERNAL MEDICINE
Payer: MEDICARE

## 2025-03-21 DIAGNOSIS — A41.9 SEPSIS WITHOUT ACUTE ORGAN DYSFUNCTION, DUE TO UNSPECIFIED ORGANISM (MULTI): ICD-10-CM

## 2025-03-21 DIAGNOSIS — N12 PYELONEPHRITIS: Primary | ICD-10-CM

## 2025-03-21 DIAGNOSIS — F41.9 ANXIETY: Primary | ICD-10-CM

## 2025-03-21 LAB
ALBUMIN SERPL BCP-MCNC: 3.6 G/DL (ref 3.4–5)
ALP SERPL-CCNC: 134 U/L (ref 33–136)
ALT SERPL W P-5'-P-CCNC: 15 U/L (ref 7–45)
ANION GAP SERPL CALCULATED.3IONS-SCNC: 10 MMOL/L (ref 10–20)
APPEARANCE UR: ABNORMAL
APPEARANCE UR: CLEAR
AST SERPL W P-5'-P-CCNC: 23 U/L (ref 9–39)
BASOPHILS # BLD AUTO: 0.01 X10*3/UL (ref 0–0.1)
BASOPHILS NFR BLD AUTO: 0.2 %
BILIRUB SERPL-MCNC: 0.4 MG/DL (ref 0–1.2)
BILIRUB UR STRIP.AUTO-MCNC: NEGATIVE MG/DL
BILIRUB UR STRIP.AUTO-MCNC: NEGATIVE MG/DL
BUN SERPL-MCNC: 16 MG/DL (ref 6–23)
CALCIUM SERPL-MCNC: 8.7 MG/DL (ref 8.6–10.3)
CHLORIDE SERPL-SCNC: 106 MMOL/L (ref 98–107)
CO2 SERPL-SCNC: 29 MMOL/L (ref 21–32)
COLOR UR: COLORLESS
COLOR UR: YELLOW
CREAT SERPL-MCNC: 0.67 MG/DL (ref 0.5–1.05)
EGFRCR SERPLBLD CKD-EPI 2021: >90 ML/MIN/1.73M*2
EOSINOPHIL # BLD AUTO: 0.02 X10*3/UL (ref 0–0.4)
EOSINOPHIL NFR BLD AUTO: 0.4 %
ERYTHROCYTE [DISTWIDTH] IN BLOOD BY AUTOMATED COUNT: 12.3 % (ref 11.5–14.5)
GLUCOSE SERPL-MCNC: 88 MG/DL (ref 74–99)
GLUCOSE UR STRIP.AUTO-MCNC: NORMAL MG/DL
GLUCOSE UR STRIP.AUTO-MCNC: NORMAL MG/DL
HCT VFR BLD AUTO: 36.5 % (ref 36–46)
HGB BLD-MCNC: 11.5 G/DL (ref 12–16)
HOLD SPECIMEN: NORMAL
IMM GRANULOCYTES # BLD AUTO: 0.01 X10*3/UL (ref 0–0.5)
IMM GRANULOCYTES NFR BLD AUTO: 0.2 % (ref 0–0.9)
KETONES UR STRIP.AUTO-MCNC: NEGATIVE MG/DL
KETONES UR STRIP.AUTO-MCNC: NEGATIVE MG/DL
LACTATE SERPL-SCNC: 1.5 MMOL/L (ref 0.4–2)
LEUKOCYTE ESTERASE UR QL STRIP.AUTO: ABNORMAL
LEUKOCYTE ESTERASE UR QL STRIP.AUTO: NEGATIVE
LYMPHOCYTES # BLD AUTO: 0.6 X10*3/UL (ref 0.8–3)
LYMPHOCYTES NFR BLD AUTO: 12.4 %
MCH RBC QN AUTO: 31.7 PG (ref 26–34)
MCHC RBC AUTO-ENTMCNC: 31.5 G/DL (ref 32–36)
MCV RBC AUTO: 101 FL (ref 80–100)
MONOCYTES # BLD AUTO: 0.35 X10*3/UL (ref 0.05–0.8)
MONOCYTES NFR BLD AUTO: 7.2 %
MRSA DNA SPEC QL NAA+PROBE: NOT DETECTED
NEUTROPHILS # BLD AUTO: 3.85 X10*3/UL (ref 1.6–5.5)
NEUTROPHILS NFR BLD AUTO: 79.6 %
NITRITE UR QL STRIP.AUTO: NEGATIVE
NITRITE UR QL STRIP.AUTO: NEGATIVE
NRBC BLD-RTO: 0 /100 WBCS (ref 0–0)
PH UR STRIP.AUTO: 7.5 [PH]
PH UR STRIP.AUTO: 7.5 [PH]
PLATELET # BLD AUTO: 221 X10*3/UL (ref 150–450)
POTASSIUM SERPL-SCNC: 3.6 MMOL/L (ref 3.5–5.3)
PROT SERPL-MCNC: 6.2 G/DL (ref 6.4–8.2)
PROT UR STRIP.AUTO-MCNC: ABNORMAL MG/DL
PROT UR STRIP.AUTO-MCNC: NEGATIVE MG/DL
RBC # BLD AUTO: 3.63 X10*6/UL (ref 4–5.2)
RBC # UR STRIP.AUTO: NEGATIVE MG/DL
RBC # UR STRIP.AUTO: NEGATIVE MG/DL
RBC #/AREA URNS AUTO: NORMAL /HPF
SODIUM SERPL-SCNC: 141 MMOL/L (ref 136–145)
SP GR UR STRIP.AUTO: 1.01
SP GR UR STRIP.AUTO: 1.02
TSH SERPL-ACNC: 1.63 MIU/L (ref 0.44–3.98)
UROBILINOGEN UR STRIP.AUTO-MCNC: NORMAL MG/DL
UROBILINOGEN UR STRIP.AUTO-MCNC: NORMAL MG/DL
WBC # BLD AUTO: 4.8 X10*3/UL (ref 4.4–11.3)
WBC #/AREA URNS AUTO: NORMAL /HPF

## 2025-03-21 PROCEDURE — 87040 BLOOD CULTURE FOR BACTERIA: CPT | Mod: WESLAB | Performed by: EMERGENCY MEDICINE

## 2025-03-21 PROCEDURE — 84443 ASSAY THYROID STIM HORMONE: CPT | Performed by: INTERNAL MEDICINE

## 2025-03-21 PROCEDURE — 2500000005 HC RX 250 GENERAL PHARMACY W/O HCPCS: Performed by: INTERNAL MEDICINE

## 2025-03-21 PROCEDURE — 2550000001 HC RX 255 CONTRASTS: Performed by: EMERGENCY MEDICINE

## 2025-03-21 PROCEDURE — 2500000004 HC RX 250 GENERAL PHARMACY W/ HCPCS (ALT 636 FOR OP/ED): Performed by: INTERNAL MEDICINE

## 2025-03-21 PROCEDURE — 1210000001 HC SEMI-PRIVATE ROOM DAILY

## 2025-03-21 PROCEDURE — 71045 X-RAY EXAM CHEST 1 VIEW: CPT

## 2025-03-21 PROCEDURE — 87086 URINE CULTURE/COLONY COUNT: CPT | Mod: WESLAB | Performed by: INTERNAL MEDICINE

## 2025-03-21 PROCEDURE — 99291 CRITICAL CARE FIRST HOUR: CPT | Mod: 25 | Performed by: EMERGENCY MEDICINE

## 2025-03-21 PROCEDURE — 74177 CT ABD & PELVIS W/CONTRAST: CPT | Performed by: RADIOLOGY

## 2025-03-21 PROCEDURE — 93005 ELECTROCARDIOGRAM TRACING: CPT

## 2025-03-21 PROCEDURE — 36415 COLL VENOUS BLD VENIPUNCTURE: CPT | Performed by: EMERGENCY MEDICINE

## 2025-03-21 PROCEDURE — 2500000001 HC RX 250 WO HCPCS SELF ADMINISTERED DRUGS (ALT 637 FOR MEDICARE OP): Performed by: EMERGENCY MEDICINE

## 2025-03-21 PROCEDURE — 2500000001 HC RX 250 WO HCPCS SELF ADMINISTERED DRUGS (ALT 637 FOR MEDICARE OP): Performed by: HOSPITALIST

## 2025-03-21 PROCEDURE — 74177 CT ABD & PELVIS W/CONTRAST: CPT

## 2025-03-21 PROCEDURE — 80053 COMPREHEN METABOLIC PANEL: CPT | Performed by: EMERGENCY MEDICINE

## 2025-03-21 PROCEDURE — 87641 MR-STAPH DNA AMP PROBE: CPT | Performed by: INTERNAL MEDICINE

## 2025-03-21 PROCEDURE — 81003 URINALYSIS AUTO W/O SCOPE: CPT | Performed by: EMERGENCY MEDICINE

## 2025-03-21 PROCEDURE — 96375 TX/PRO/DX INJ NEW DRUG ADDON: CPT

## 2025-03-21 PROCEDURE — 2500000001 HC RX 250 WO HCPCS SELF ADMINISTERED DRUGS (ALT 637 FOR MEDICARE OP): Performed by: INTERNAL MEDICINE

## 2025-03-21 PROCEDURE — 96365 THER/PROPH/DIAG IV INF INIT: CPT

## 2025-03-21 PROCEDURE — 81001 URINALYSIS AUTO W/SCOPE: CPT | Performed by: INTERNAL MEDICINE

## 2025-03-21 PROCEDURE — 99223 1ST HOSP IP/OBS HIGH 75: CPT | Performed by: INTERNAL MEDICINE

## 2025-03-21 PROCEDURE — 2500000004 HC RX 250 GENERAL PHARMACY W/ HCPCS (ALT 636 FOR OP/ED): Performed by: EMERGENCY MEDICINE

## 2025-03-21 PROCEDURE — 96361 HYDRATE IV INFUSION ADD-ON: CPT

## 2025-03-21 PROCEDURE — 83605 ASSAY OF LACTIC ACID: CPT | Performed by: EMERGENCY MEDICINE

## 2025-03-21 PROCEDURE — 85025 COMPLETE CBC W/AUTO DIFF WBC: CPT | Performed by: EMERGENCY MEDICINE

## 2025-03-21 PROCEDURE — 87640 STAPH A DNA AMP PROBE: CPT | Performed by: INTERNAL MEDICINE

## 2025-03-21 RX ORDER — TRAMADOL HYDROCHLORIDE 50 MG/1
50 TABLET ORAL
Status: DISCONTINUED | OUTPATIENT
Start: 2025-03-21 | End: 2025-03-22

## 2025-03-21 RX ORDER — MIRTAZAPINE 15 MG/1
7.5 TABLET, FILM COATED ORAL NIGHTLY
Status: ON HOLD | COMMUNITY

## 2025-03-21 RX ORDER — ACETAMINOPHEN 325 MG/1
650 TABLET ORAL EVERY 4 HOURS PRN
Status: DISPENSED | OUTPATIENT
Start: 2025-03-21

## 2025-03-21 RX ORDER — RIFABUTIN 150 MG/1
300 CAPSULE ORAL DAILY
Status: ACTIVE | OUTPATIENT
Start: 2025-03-22

## 2025-03-21 RX ORDER — RIFABUTIN 150 MG/1
300 CAPSULE ORAL DAILY
Status: DISCONTINUED | OUTPATIENT
Start: 2025-03-21 | End: 2025-03-21

## 2025-03-21 RX ORDER — SODIUM CHLORIDE 9 MG/ML
100 INJECTION, SOLUTION INTRAVENOUS ONCE
Status: COMPLETED | OUTPATIENT
Start: 2025-03-21 | End: 2025-03-21

## 2025-03-21 RX ORDER — VANCOMYCIN HYDROCHLORIDE 1 G/20ML
INJECTION, POWDER, LYOPHILIZED, FOR SOLUTION INTRAVENOUS DAILY PRN
Status: DISCONTINUED | OUTPATIENT
Start: 2025-03-21 | End: 2025-03-22

## 2025-03-21 RX ORDER — ISONIAZID 300 MG/1
300 TABLET ORAL DAILY
Status: DISCONTINUED | OUTPATIENT
Start: 2025-03-21 | End: 2025-03-21

## 2025-03-21 RX ORDER — ISONIAZID 300 MG/1
300 TABLET ORAL DAILY
Status: ON HOLD | COMMUNITY

## 2025-03-21 RX ORDER — ISONIAZID 300 MG/1
300 TABLET ORAL DAILY
Status: ACTIVE | OUTPATIENT
Start: 2025-03-22

## 2025-03-21 RX ORDER — METOPROLOL TARTRATE 50 MG/1
50 TABLET ORAL ONCE
Status: COMPLETED | OUTPATIENT
Start: 2025-03-21 | End: 2025-03-21

## 2025-03-21 RX ORDER — METOPROLOL TARTRATE 50 MG/1
50 TABLET ORAL 2 TIMES DAILY
Status: DISPENSED | OUTPATIENT
Start: 2025-03-21

## 2025-03-21 RX ORDER — ACETAMINOPHEN 650 MG/1
650 SUPPOSITORY RECTAL EVERY 4 HOURS PRN
Status: ACTIVE | OUTPATIENT
Start: 2025-03-21

## 2025-03-21 RX ORDER — ACETAMINOPHEN 160 MG/5ML
650 SOLUTION ORAL EVERY 4 HOURS PRN
Status: ACTIVE | OUTPATIENT
Start: 2025-03-21

## 2025-03-21 RX ORDER — NITROFURANTOIN 25; 75 MG/1; MG/1
100 CAPSULE ORAL 2 TIMES DAILY
Status: ON HOLD | COMMUNITY

## 2025-03-21 RX ORDER — ONDANSETRON HYDROCHLORIDE 2 MG/ML
4 INJECTION, SOLUTION INTRAVENOUS EVERY 8 HOURS PRN
Status: ACTIVE | OUTPATIENT
Start: 2025-03-21

## 2025-03-21 RX ORDER — ACETAMINOPHEN 325 MG/1
650 TABLET ORAL EVERY 4 HOURS PRN
Status: ACTIVE | OUTPATIENT
Start: 2025-03-21

## 2025-03-21 RX ORDER — ONDANSETRON 4 MG/1
4 TABLET, FILM COATED ORAL EVERY 8 HOURS PRN
Status: ACTIVE | OUTPATIENT
Start: 2025-03-21

## 2025-03-21 RX ORDER — FAMOTIDINE 20 MG/1
20 TABLET, FILM COATED ORAL 2 TIMES DAILY
Status: DISPENSED | OUTPATIENT
Start: 2025-03-21

## 2025-03-21 RX ORDER — LABETALOL HYDROCHLORIDE 5 MG/ML
10 INJECTION, SOLUTION INTRAVENOUS EVERY 6 HOURS PRN
Status: DISPENSED | OUTPATIENT
Start: 2025-03-21

## 2025-03-21 RX ORDER — ACETAMINOPHEN 500 MG
1000 TABLET ORAL EVERY 6 HOURS PRN
Status: ON HOLD | COMMUNITY

## 2025-03-21 RX ORDER — LANOLIN ALCOHOL/MO/W.PET/CERES
50 CREAM (GRAM) TOPICAL DAILY
Status: DISCONTINUED | OUTPATIENT
Start: 2025-03-21 | End: 2025-03-21

## 2025-03-21 RX ORDER — LANOLIN ALCOHOL/MO/W.PET/CERES
50 CREAM (GRAM) TOPICAL DAILY
Status: ACTIVE | OUTPATIENT
Start: 2025-03-22

## 2025-03-21 RX ORDER — LISINOPRIL 20 MG/1
20 TABLET ORAL DAILY
Status: DISPENSED | OUTPATIENT
Start: 2025-03-21

## 2025-03-21 RX ORDER — CEFTRIAXONE 2 G/50ML
2 INJECTION, SOLUTION INTRAVENOUS ONCE
Status: COMPLETED | OUTPATIENT
Start: 2025-03-21 | End: 2025-03-21

## 2025-03-21 RX ORDER — DOCUSATE SODIUM 100 MG/1
100 CAPSULE, LIQUID FILLED ORAL DAILY
Status: ON HOLD | COMMUNITY

## 2025-03-21 RX ORDER — TALC
3 POWDER (GRAM) TOPICAL NIGHTLY PRN
Status: DISPENSED | OUTPATIENT
Start: 2025-03-21

## 2025-03-21 RX ORDER — MIRTAZAPINE 7.5 MG/1
7.5 TABLET, FILM COATED ORAL NIGHTLY
Status: DISPENSED | OUTPATIENT
Start: 2025-03-21

## 2025-03-21 RX ORDER — ONDANSETRON HYDROCHLORIDE 2 MG/ML
4 INJECTION, SOLUTION INTRAVENOUS ONCE
Status: COMPLETED | OUTPATIENT
Start: 2025-03-21 | End: 2025-03-21

## 2025-03-21 RX ORDER — VANCOMYCIN 1.5 G/300ML
1500 INJECTION, SOLUTION INTRAVENOUS EVERY 24 HOURS
Status: DISCONTINUED | OUTPATIENT
Start: 2025-03-21 | End: 2025-03-22

## 2025-03-21 RX ORDER — MORPHINE SULFATE 2 MG/ML
2 INJECTION, SOLUTION INTRAMUSCULAR; INTRAVENOUS ONCE
Status: COMPLETED | OUTPATIENT
Start: 2025-03-21 | End: 2025-03-21

## 2025-03-21 RX ORDER — RIFABUTIN 150 MG/1
300 CAPSULE ORAL DAILY
Status: ON HOLD | COMMUNITY

## 2025-03-21 RX ORDER — FAMOTIDINE 10 MG/ML
20 INJECTION, SOLUTION INTRAVENOUS 2 TIMES DAILY
Status: ACTIVE | OUTPATIENT
Start: 2025-03-21

## 2025-03-21 RX ADMIN — METOPROLOL TARTRATE 50 MG: 50 TABLET, FILM COATED ORAL at 12:55

## 2025-03-21 RX ADMIN — PIPERACILLIN SODIUM AND TAZOBACTAM SODIUM 3.38 G: 3; .375 INJECTION, SOLUTION INTRAVENOUS at 21:25

## 2025-03-21 RX ADMIN — APIXABAN 5 MG: 5 TABLET, FILM COATED ORAL at 20:37

## 2025-03-21 RX ADMIN — ACETAMINOPHEN 650 MG: 325 TABLET, FILM COATED ORAL at 17:50

## 2025-03-21 RX ADMIN — IOHEXOL 75 ML: 350 INJECTION, SOLUTION INTRAVENOUS at 11:03

## 2025-03-21 RX ADMIN — FAMOTIDINE 20 MG: 20 TABLET, FILM COATED ORAL at 20:37

## 2025-03-21 RX ADMIN — Medication 3 MG: at 20:37

## 2025-03-21 RX ADMIN — PIPERACILLIN SODIUM AND TAZOBACTAM SODIUM 3.38 G: 3; .375 INJECTION, SOLUTION INTRAVENOUS at 16:27

## 2025-03-21 RX ADMIN — ISONIAZID 300 MG: 300 TABLET ORAL at 13:54

## 2025-03-21 RX ADMIN — TRAMADOL HYDROCHLORIDE 50 MG: 50 TABLET, COATED ORAL at 20:37

## 2025-03-21 RX ADMIN — CEFTRIAXONE SODIUM 2 G: 2 INJECTION, SOLUTION INTRAVENOUS at 09:19

## 2025-03-21 RX ADMIN — MIRTAZAPINE 7.5 MG: 7.5 TABLET, FILM COATED ORAL at 20:42

## 2025-03-21 RX ADMIN — RIFABUTIN 300 MG: 150 CAPSULE ORAL at 13:54

## 2025-03-21 RX ADMIN — SODIUM CHLORIDE 1000 ML: 9 INJECTION, SOLUTION INTRAVENOUS at 09:19

## 2025-03-21 RX ADMIN — METOPROLOL TARTRATE 50 MG: 50 TABLET, FILM COATED ORAL at 20:36

## 2025-03-21 RX ADMIN — SODIUM CHLORIDE 100 ML/HR: 900 INJECTION, SOLUTION INTRAVENOUS at 16:29

## 2025-03-21 RX ADMIN — SODIUM CHLORIDE 1000 ML: 9 INJECTION, SOLUTION INTRAVENOUS at 13:30

## 2025-03-21 RX ADMIN — LISINOPRIL 20 MG: 20 TABLET ORAL at 16:27

## 2025-03-21 RX ADMIN — ONDANSETRON 4 MG: 2 INJECTION, SOLUTION INTRAMUSCULAR; INTRAVENOUS at 09:44

## 2025-03-21 RX ADMIN — MORPHINE SULFATE 2 MG: 2 INJECTION, SOLUTION INTRAMUSCULAR; INTRAVENOUS at 09:44

## 2025-03-21 RX ADMIN — VANCOMYCIN 1.5 G: 1.5 INJECTION, SOLUTION INTRAVENOUS at 17:50

## 2025-03-21 RX ADMIN — PYRIDOXINE HCL TAB 50 MG 50 MG: 50 TAB at 14:17

## 2025-03-21 SDOH — SOCIAL STABILITY: SOCIAL INSECURITY: HAS ANYONE EVER THREATENED TO HURT YOUR FAMILY OR YOUR PETS?: NO

## 2025-03-21 SDOH — SOCIAL STABILITY: SOCIAL INSECURITY: ABUSE: ADULT

## 2025-03-21 SDOH — SOCIAL STABILITY: SOCIAL INSECURITY: ARE THERE ANY APPARENT SIGNS OF INJURIES/BEHAVIORS THAT COULD BE RELATED TO ABUSE/NEGLECT?: NO

## 2025-03-21 SDOH — SOCIAL STABILITY: SOCIAL INSECURITY: WERE YOU ABLE TO COMPLETE ALL THE BEHAVIORAL HEALTH SCREENINGS?: YES

## 2025-03-21 SDOH — SOCIAL STABILITY: SOCIAL INSECURITY: DOES ANYONE TRY TO KEEP YOU FROM HAVING/CONTACTING OTHER FRIENDS OR DOING THINGS OUTSIDE YOUR HOME?: NO

## 2025-03-21 SDOH — SOCIAL STABILITY: SOCIAL INSECURITY: ARE YOU OR HAVE YOU BEEN THREATENED OR ABUSED PHYSICALLY, EMOTIONALLY, OR SEXUALLY BY ANYONE?: NO

## 2025-03-21 SDOH — SOCIAL STABILITY: SOCIAL INSECURITY: DO YOU FEEL ANYONE HAS EXPLOITED OR TAKEN ADVANTAGE OF YOU FINANCIALLY OR OF YOUR PERSONAL PROPERTY?: NO

## 2025-03-21 SDOH — SOCIAL STABILITY: SOCIAL INSECURITY: DO YOU FEEL UNSAFE GOING BACK TO THE PLACE WHERE YOU ARE LIVING?: NO

## 2025-03-21 SDOH — SOCIAL STABILITY: SOCIAL INSECURITY: HAVE YOU HAD THOUGHTS OF HARMING ANYONE ELSE?: NO

## 2025-03-21 ASSESSMENT — ACTIVITIES OF DAILY LIVING (ADL)
JUDGMENT_ADEQUATE_SAFELY_COMPLETE_DAILY_ACTIVITIES: NO
HEARING - RIGHT EAR: FUNCTIONAL
WALKS IN HOME: NEEDS ASSISTANCE
TOILETING: NEEDS ASSISTANCE
PATIENT'S MEMORY ADEQUATE TO SAFELY COMPLETE DAILY ACTIVITIES?: NO
FEEDING YOURSELF: INDEPENDENT
GROOMING: NEEDS ASSISTANCE
DRESSING YOURSELF: NEEDS ASSISTANCE
HEARING - LEFT EAR: FUNCTIONAL
ADEQUATE_TO_COMPLETE_ADL: YES
BATHING: NEEDS ASSISTANCE

## 2025-03-21 ASSESSMENT — COGNITIVE AND FUNCTIONAL STATUS - GENERAL
HELP NEEDED FOR BATHING: A LITTLE
HELP NEEDED FOR BATHING: A LITTLE
MOBILITY SCORE: 14
DAILY ACTIVITIY SCORE: 17
WALKING IN HOSPITAL ROOM: A LOT
MOVING TO AND FROM BED TO CHAIR: A LOT
MOVING TO AND FROM BED TO CHAIR: A LOT
DAILY ACTIVITIY SCORE: 18
MOVING FROM LYING ON BACK TO SITTING ON SIDE OF FLAT BED WITH BEDRAILS: A LITTLE
CLIMB 3 TO 5 STEPS WITH RAILING: A LOT
STANDING UP FROM CHAIR USING ARMS: A LOT
TURNING FROM BACK TO SIDE WHILE IN FLAT BAD: A LITTLE
STANDING UP FROM CHAIR USING ARMS: A LOT
DRESSING REGULAR UPPER BODY CLOTHING: A LITTLE
DRESSING REGULAR LOWER BODY CLOTHING: A LITTLE
TOILETING: A LITTLE
PERSONAL GROOMING: A LITTLE
EATING MEALS: A LITTLE
TOILETING: A LITTLE
TURNING FROM BACK TO SIDE WHILE IN FLAT BAD: A LITTLE
PERSONAL GROOMING: A LITTLE
DRESSING REGULAR LOWER BODY CLOTHING: A LITTLE
MOBILITY SCORE: 14
EATING MEALS: A LOT
MOVING FROM LYING ON BACK TO SITTING ON SIDE OF FLAT BED WITH BEDRAILS: A LITTLE
DRESSING REGULAR UPPER BODY CLOTHING: A LITTLE
CLIMB 3 TO 5 STEPS WITH RAILING: A LOT
WALKING IN HOSPITAL ROOM: A LOT
PATIENT BASELINE BEDBOUND: NO

## 2025-03-21 ASSESSMENT — LIFESTYLE VARIABLES
SKIP TO QUESTIONS 9-10: 1
HOW MANY STANDARD DRINKS CONTAINING ALCOHOL DO YOU HAVE ON A TYPICAL DAY: PATIENT DOES NOT DRINK
HOW OFTEN DO YOU HAVE 6 OR MORE DRINKS ON ONE OCCASION: NEVER
AUDIT-C TOTAL SCORE: 0
HOW OFTEN DO YOU HAVE A DRINK CONTAINING ALCOHOL: NEVER
AUDIT-C TOTAL SCORE: 0

## 2025-03-21 ASSESSMENT — PATIENT HEALTH QUESTIONNAIRE - PHQ9
SUM OF ALL RESPONSES TO PHQ9 QUESTIONS 1 & 2: 0
1. LITTLE INTEREST OR PLEASURE IN DOING THINGS: NOT AT ALL
2. FEELING DOWN, DEPRESSED OR HOPELESS: NOT AT ALL

## 2025-03-21 ASSESSMENT — PAIN SCALES - GENERAL
PAINLEVEL_OUTOF10: 0 - NO PAIN
PAINLEVEL_OUTOF10: 3
PAINLEVEL_OUTOF10: 8
PAINLEVEL_OUTOF10: 0 - NO PAIN
PAINLEVEL_OUTOF10: 0 - NO PAIN

## 2025-03-21 ASSESSMENT — PAIN - FUNCTIONAL ASSESSMENT
PAIN_FUNCTIONAL_ASSESSMENT: 0-10

## 2025-03-21 ASSESSMENT — PAIN DESCRIPTION - ORIENTATION: ORIENTATION: LOWER

## 2025-03-21 ASSESSMENT — PAIN DESCRIPTION - LOCATION: LOCATION: BACK

## 2025-03-21 NOTE — H&P
History Of Present Illness  Mishel Scanlon is a 74 y.o. female presenting with generalized weakness, feeling sick.     Patient is a 74 years old  female with past medical history of left breast cancer s/p lumpectomy and radiation, hypertension, history of TB infection diagnosed in 2024 on therapy for few more weeks.  Patient presented to [facility] complaining of generalized weakness.  History was provided from her son present in the room and from the patient partially.  According to her son's patient was having urinary urgency and frequency in the last 2 to 3 days.  She was started on Macrobid yesterday.  She got about the 3 dose of MicroBid.  Patient denied of fever but complained of chills intermittently.  In ER patient was found to be tachycardic and hypertensive.  She was started on fluids started on antibiotics for possible UTI.  Urinalysis with microscopy did not reveal UTI.  Patient had CT abdomen pelvis chronic constipation that is thickening of the urinary bladder due to incomplete distention, interval improvement of the large left effusion, there is small right pleural effusion.  Patient was admitted to hospital for further evaluation and treatment.  Patient stated she had TB exposure in 2024 initially she was on prophylaxis but later on decision was made to treat for active infection.  Patient is on isoniazid and rifabutin.  Patient supposed to be on this medication through April 3, 2025.  Patient follow-up with Dr. Dumas at [facility].  Her son stated he take her medication in the pharmacy insulin.  Patient has been compliant with medication appointment.  She denied abdominal pain, nausea or vomiting.  Patient stated she had loose stools in the last few days about 2-3 times a day.  She denied to have any history of travel recently or contact with any sick person.  Patient was admitted to hospital for further evaluation treatment.        Past Medical History  Past Medical  History:   Diagnosis Date    Breast cancer (Multi)     Neck pain        Surgical History  Past Surgical History:   Procedure Laterality Date    BREAST SURGERY      KIDNEY SURGERY          Social History  She reports that she has never smoked. She has never been exposed to tobacco smoke. She has never used smokeless tobacco. She reports that she does not drink alcohol and does not use drugs.    Family History  Family History   Problem Relation Name Age of Onset    Breast cancer Mother      Heart disease Father          Allergies  Corticosteroids (glucocorticoids) and Denosumab    Review of Systems  General: Negative for fever, positive for chills and fatigue.    HEENT: Negative for headache, blurring of vision or double vision.    Cardiovascular: Negative for chest pain, palpitations or orthopnea.    Respiratory: Negative for cough, shortness of breath or wheezing.    Gastrointestinal: Negative for nausea, vomiting, hematemesis, abdominal pain , positive for loose stool.   Genitourinary: Urinary urgency and frequency   musculoskeletal: Negative for joint pain, joint swelling or deformity.   Skin: Negative for rash, itching, or jaundice.   Hematologic: Negative for bleeding or bruising.   Neurologic: Negative for headache, loss of consciousness. syncope or seizures   Psychological: Negative for anxiety, hallucinations or depression.      Physical Exam  General: In moderate distress, having chills, tremor, cooperating during physical exam.  HEENT: Pupils are equal and reactive to light and commendation , oral mucosa dry, no JVD   Cardiovascular: Tachycardic  Lungs: Breath sound on the left lung base  Abdomen: No hepatosplenomegaly appreciated, soft , not tender, positive bowel sounds, positive bowel movement.  Neuro: Alert and oriented x3, strength in upper and lower extremities , sensation intact.  Psych: Not great insight was going on   musculoskeletal: +1 pedal edema both lower extremity  Vascular: Pulses are intact  "in upper and lower extremities  Skin: No petechiae, ecchymosis or other stigmata for dermatology disease.      Last Recorded Vitals  Blood pressure (!) 169/95, pulse 91, temperature 36.7 °C (98.1 °F), temperature source Oral, resp. rate 19, height 1.626 m (5' 4\"), weight 69.4 kg (153 lb), SpO2 96%.    Assessment/Plan        Systemic inflammatory response syndrome  Probably secondary to UTI  Patient UA was normal but was started on antibiotics yesterday.  Check blood culture  Patient remained tachycardic tachypneic, urinary urgency and frequency  CT abdomen pelvis no evidence of hydronephrosis, thickening of urinary bladder  Evidence of left pleural effusion  I will cover empirically with antibiotics  Patient will be started on Zosyn and vancomycin  I will take an opinion from ID    Left pleural effusion  Discussed with pulmonary  Dr Tucker on the case.  Waiting for pulmonary to see her today.    Mycobacterial tuberculosis infection  Patient had exposure in March 2024.    She was started on medication.  Patient is on isoniazid and rifabutin through April 4 according to her son  Discussed with ID on the case.  No need for isolation    Hypertension  Not well-controlled  Resume her home medication  Patient is on lisinopril and metoprolol  Ordered labetalol for systolic blood pressure more than 180    History of breast cancer  S/p lumpectomy and radiation  Monitor close    Atrial fibrillation  He is on metoprolol and Eliquis continue with both medication      Deconditioning  Precaution  Ambulate with assistant    Discussed in detail with her son Cristofer GREENE present in the room  Patient is DNR CCA/DNI okay with ICU admission  Patient is at high risk for deterioration having life-threatening  arrhythmias.  Monitor close.    Addendum  3:38 AM  Discussed with pulmonary, no plan for thoracocentesis  Resume Eliquis  I spent 60 minutes in the professional and overall care of this patient.      Dottie Goyal MD    "

## 2025-03-21 NOTE — NURSING NOTE
Nurse assumed care of patient, patient awake in bed, call light within reach. Patient on heart monitor.

## 2025-03-21 NOTE — ED PROVIDER NOTES
EMERGENCY DEPARTMENT ENCOUNTER      Pt Name: Mishel Scanlon  MRN: [id number]  Birthdate 1950  Date of evaluation: 3/21/2025  ED Provider: Donna Kapadia DO     CHIEF COMPLAINT       Chief Complaint   Patient presents with    UTI     Patient seen yesterday for a UTI and was placed on antibiotics. Family sent her here today because she seems to be worse.       HISTORY OF PRESENT ILLNESS    Mishel Scanlon is a 74 y.o. who presents to the emergency department via EMS with complaint of weakness and potential urinary tract infection.  Symptoms have been ongoing for the past week.  She saw her primary care and had a urinalysis obtained on the 18th.  This returned demonstrating a nitrite positive urinary tract infection.  She was started on antibiotics yesterday however feels as though she has been getting worse.  She complains of pain in her left lower back as well as increased urgency and frequency.  She has been feeling chilled though she has not had an actual fever.  She denies any abdominal pain other than pressure in her suprapubic area.    REVIEW OF SYSTEMS     Focused ROS performed and negative other than as listed in HPI    PAST MEDICAL HISTORY     Past Medical History:   Diagnosis Date    Breast cancer (Multi)     Neck pain        SURGICAL HISTORY       Past Surgical History:   Procedure Laterality Date    BREAST SURGERY      KIDNEY SURGERY         CURRENT MEDICATIONS       Previous Medications    ACETAMINOPHEN (TYLENOL) 500 MG TABLET    Take 2 tablets (1,000 mg) by mouth every 6 hours if needed for mild pain (1 - 3).    DOCUSATE SODIUM (COLACE) 100 MG CAPSULE    Take 1 capsule (100 mg) by mouth once daily.    ELIQUIS 5 MG TABLET    Take 1 tablet (5 mg) by mouth twice a day.    ISONIAZID (NYDRAZID) 300 MG TABLET    Take 1 tablet (300 mg) by mouth once daily.    LISINOPRIL 20 MG TABLET    TAKE ONE TABLET BY MOUTH EVERY DAY IN THE MORNING    METOPROLOL TARTRATE (LOPRESSOR) 50 MG TABLET    Take 1 tablet by mouth  2 times a day.    MIRTAZAPINE (REMERON) 15 MG TABLET    Take 0.5 tablets (7.5 mg) by mouth once daily at bedtime.    NITROFURANTOIN, MACROCRYSTAL-MONOHYDRATE, (MACROBID) 100 MG CAPSULE    Take 1 capsule (100 mg) by mouth 2 times a day.    PYRIDOXINE HCL, VITAMIN B6, (VITAMIN B-6 ORAL)    Take 1 each by mouth once daily.    RIFABUTIN (MYCOBUTIN) 150 MG CAPSULE    Take 2 capsules (300 mg) by mouth once daily.    TRAMADOL (ULTRAM) 50 MG TABLET    Take 1 tablet by mouth two times a day as needed for pain for up to 30 days. Do not start before December 17, 2023.       ALLERGIES     Corticosteroids (glucocorticoids) and Denosumab    FAMILY HISTORY       Family History   Problem Relation Name Age of Onset    Breast cancer Mother      Heart disease Father          SOCIAL HISTORY       Social History     Socioeconomic History    Marital status:    Tobacco Use    Smoking status: Never     Passive exposure: Never    Smokeless tobacco: Never   Vaping Use    Vaping status: Never Used   Substance and Sexual Activity    Alcohol use: Never    Drug use: Never    Sexual activity: Defer     Social Drivers of Health     Financial Resource Strain: Low Risk  (12/18/2024)    Received from [facility]    Overall Financial Resource Strain (CARDIA)     Difficulty of Paying Living Expenses: Not hard at all   Food Insecurity: No Food Insecurity (12/18/2024)    Received from [facility]    Hunger Vital Sign     Worried About Running Out of Food in the Last Year: Never true     Ran Out of Food in the Last Year: Never true   Transportation Needs: No Transportation Needs (12/18/2024)    Received from [facility]    PRAPARE - Transportation     Lack of Transportation (Medical): No     Lack of Transportation (Non-Medical): No   Physical Activity: Inactive (7/30/2024)    Received from [facility]    Exercise Vital Sign     Days of Exercise per Week: 0 days     Minutes of Exercise per Session: 0 min   Stress: Stress Concern  Present (5/2/2024)    Received from [facility]    [employer] [employer] - Occupational Stress Questionnaire     Feeling of Stress : Very much   Social Connections: Socially Isolated (5/2/2024)    Received from [facility]    Social Connection and Isolation Panel [NHANES]     Frequency of Communication with Friends and Family: More than three times a week     Frequency of Social Gatherings with Friends and Family: More than three times a week     Attends Christianity Services: Never     Active Member of Clubs or Organizations: No     Attends Club or Organization Meetings: Never     Marital Status:    Housing Stability: Low Risk  (12/18/2024)    Received from [facility]    Housing Stability Vital Sign     Unable to Pay for Housing in the Last Year: No     Number of Times Moved in the Last Year: 0     Homeless in the Last Year: No       PHYSICAL EXAM       ED Triage Vitals   Temperature Heart Rate Respirations BP   03/21/25 0846 03/21/25 0846 03/21/25 0846 03/21/25 0846   36.9 °C (98.4 °F) 97 18 (!) 141/107      Pulse Ox Temp Source Heart Rate Source Patient Position   03/21/25 0846 03/21/25 0925 -- --   98 % Oral        BP Location FiO2 (%)     -- --              General: Appears uncomfortable, chilled, but in no acute distress, alert  Head: Head atraumatic; normocephalic  Eyes: normal inspection; no icterus  ENT: mucosa moist without lesion  Neck: Normal inspection, no meningeal signs  Resp: Normal breath sounds, no wheeze or crackles; No respiratory distress  Chest Wall: no tenderness or deformity  Heart: Heart rate slightly tachycardic and rhythm regular; No Murmurs  Abdomen: Soft, mild suprapubic pressure, no CVA tenderness; No distention, guarding, rigidity, or rebound  MSK: Normal appearance; Moves all extremities; No Pedal edema  Neuro: Alert; no focal deficits, moves all extremities  Psych: Mood and Affect normal  Skin: Color appropriate; warm; Dry    DIAGNOSTIC RESULTS    Lab and radiology results are independently interpreted unless noted below.  RADIOLOGY (Per Emergency Physician):     Interpretation per the Radiologist below, if available at the time of this note:  CT abdomen pelvis w IV contrast   Final Result   1. Interval improvement of previously seen large left effusion and   interval development of small right pleural effusion with bibasilar   infiltrates/atelectatic changes.   2. Chronic constipation with possible mild stercoral colitis   involving the rectum. Also few colonic diverticula without acute   diverticulitis.   3. Apparent mild wall thickening of the urinary bladder, possibly due   to incomplete distention, with possible small bladder diverticula.   4. Additional chronic findings again present as above.        Signed by: Tommy Cavanaugh 3/21/2025 11:57 AM   Dictation workstation:   [id number]      XR chest 1 view   Final Result   New small left pleural effusion, superimposed infiltrate should be   considered given patient presentation of sepsis. Further evaluation   with CT chest could be obtained as per clinical need.        Signed by: Jennifer Gomez 3/21/2025 10:11 AM   Dictation workstation:   [id number]          LABS:  Abnormal Labs Reviewed   CBC WITH AUTO DIFFERENTIAL - Abnormal; Notable for the following components:       Result Value    RBC 3.63 (*)     Hemoglobin 11.5 (*)      (*)     MCHC 31.5 (*)     Lymphocytes Absolute 0.60 (*)     All other components within normal limits   COMPREHENSIVE METABOLIC PANEL - Abnormal; Notable for the following components:    Total Protein 6.2 (*)     All other components within normal limits   URINALYSIS WITH REFLEX CULTURE AND MICROSCOPIC - Abnormal; Notable for the following components:    Appearance, Urine Turbid (*)     All other components within normal limits       All other labs were within normal range or not returned as of this dictation.    EMERGENCY DEPARTMENT COURSE/MDM   Patient presents with  complaints of ongoing urinary tract infection.  Clinically I do have concern for sepsis as the patient does appear to be chilled.  Chart is reviewed and does show nitrite positive urinary tract infection on urinalysis on the 18th.  She has not started on treatment till yesterday.  This treatment also would not cover any potential pyelonephritis.  Septic workup is initiated and she is empirically covered with a dose of Rocephin.  She is agreeable this plan of care.    ED Course as of 03/21/25 1503   Fri Mar 21, 2025   1004 Urinalysis returned showing no evidence of acute UTI however this may be a false negative given the recent use of antibiotics. [EF]   1328 CT scan returned showing no acute clinical abnormalities other than thickening of the bladder wall.  I have however concerned that the patient is developing severe sepsis from a urinary tract infection as seen on the urinalysis from 8/13.  This is likely developing into an acute pyelonephritis.  Given this the patient will be recommended for admission.  Case is discussed with the patient's hospitalist who accept the patient for admission.  Patient is admitted in stable condition. [EF]   1419 EKG personally turbid by me performed at 1336  Sinus tachycardia ventricular 105 normal axis and intervals no acute ischemic [EF]      ED Course User Index  [EF] Donna Kapadia, DO         Diagnoses as of 03/21/25 1503   Pyelonephritis   Sepsis without acute organ dysfunction, due to unspecified organism (Multi)       Meds Administered:  Medications   isoniazid (Nydrazid) tablet 300 mg (300 mg oral Given 3/21/25 1354)   rifabutin (Mycobutin) capsule 300 mg (300 mg oral Given 3/21/25 1354)   pyridoxine (Vitamin B-6) tablet 50 mg (50 mg oral Given 3/21/25 1417)   sodium chloride 0.9 % bolus 1,000 mL (0 mL intravenous Stopped 3/21/25 1116)   cefTRIAXone (Rocephin) 2 g in dextrose (iso) IV 50 mL (0 g intravenous Stopped 3/21/25 0949)   morphine injection 2 mg (2 mg intravenous  Given 3/21/25 0944)   ondansetron (Zofran) injection 4 mg (4 mg intravenous Given 3/21/25 0944)   iohexol (OMNIPaque) 350 mg iodine/mL solution 75 mL (75 mL intravenous Given 3/21/25 1103)   metoprolol tartrate (Lopressor) tablet 50 mg (50 mg oral Given 3/21/25 1255)   sodium chloride 0.9 % bolus 1,000 mL (0 mL intravenous Stopped 3/21/25 1437)       PROCEDURES   Unless otherwise noted below, none  Critical Care    Performed by: Donna Kapadia DO  Authorized by: Donna Kapadia DO    Critical care provider statement:     Critical care time (minutes):  35    Critical care time was exclusive of:  Separately billable procedures and treating other patients    Critical care was necessary to treat or prevent imminent or life-threatening deterioration of the following conditions:  Sepsis    Critical care was time spent personally by me on the following activities:  Ordering and review of laboratory studies, ordering and performing treatments and interventions, ordering and review of radiographic studies, pulse oximetry, re-evaluation of patient's condition, review of old charts, obtaining history from patient or surrogate, examination of patient, evaluation of patient's response to treatment, discussions with consultants and development of treatment plan with patient or surrogate    Care discussed with: admitting provider          FINAL IMPRESSION      1. Pyelonephritis    2. Sepsis without acute organ dysfunction, due to unspecified organism (Multi)          DISPOSITION    Admit 03/21/2025 01:28:13 PM  As a result of their workup, the patient will require admission to the hospital.  The patient was informed of her diagnosis.  The patient was given the opportunity to ask questions and I answered them. The patient agreed to be admitted to the hospital.    Critical Care time: See Procedure Note    (Comment: Please note this report has been produced using speech recognition software and may contain errors related to that  system including errors in grammar, punctuation, and spelling, as well as words and phrases that may be inappropriate.  If there are any questions or concerns please feel free to contact the dictating provider for clarification.)    Donna Kapadia DO (electronically signed)  Emergency Medicine Physician    History provided by: Patient  Limitations to History: None  External Records Reviewed with Brief Summary:  Outpatient notes as well as outpatient urinalysis demonstrating a nitrite positive urinary tract.  Social Determinants of Health which Significantly Impact Care: None identified   EKG Independent Interpretation: EKG interpreted by myself. Please see ED Course for full interpretation.  Independent Result Review and Interpretation: Relevant laboratory and radiographic results were reviewed and independently interpreted by myself.  As necessary, they are commented on in the ED Course.  Chronic conditions affecting the patient's care: As documented above in MDM  The patient was discussed with the following consultants/services: Hospitalist/Admitting Provider who accepted the patient for admission  Care Considerations: As documented above in MDM               Donna Kapadia DO  03/21/25 1509

## 2025-03-21 NOTE — CONSULTS
Vancomycin Dosing by Pharmacy- INITIAL    Mishel Scanlon is a 74 y.o. year old female who Pharmacy has been consulted for vancomycin dosing for pneumonia. Based on the patient's indication and renal status this patient will be dosed based on a goal AUC of 400-600.     Renal function is currently stable.    Visit Vitals  BP (!) 158/91   Pulse 89   Temp 36.7 °C (98.1 °F) (Oral)   Resp 17        Lab Results   Component Value Date    CREATININE 0.67 2025    CREATININE 0.60 2024    CREATININE 0.70 2024    CREATININE 0.5 2023    CREATININE 0.5 2023    CREATININE 0.6 2023    CREATININE 0.6 2023        Patient weight is as follows:   Vitals:    25 0846   Weight: 69.4 kg (153 lb)       Cultures:  No results found for the encounter in last 14 days.        No intake/output data recorded.  I/O during current shift:  I/O this shift:  In: 50 [IV Piggyback:50]  Out: -     Temp (24hrs), Av.8 °C (98.3 °F), Min:36.7 °C (98.1 °F), Max:36.9 °C (98.4 °F)         Assessment/Plan     Patient will not be given a loading dose.  Will initiate vancomycin maintenance,  1500 mg every 24 hours.    This dosing regimen is predicted by [employer] to result in the following pharmacokinetic parameters:    Loading dose: N/A  Regimen: 1500 mg IV every 24 hours.  Start time: 18:00 on 2025  Exposure target: AUC24 (range)400-600 mg/L.hr   STV70-17: 370 mg/L.hr  AUC24,ss: 449 mg/L.hr  Probability of AUC24 > 400: 64 %  Ctrough,ss: 11.8 mg/L  Probability of Ctrough,ss > 20: 11 %    Follow-up level will be ordered on 3/22 at 0500 unless clinically indicated sooner.  Will continue to monitor renal function daily while on vancomycin and order serum creatinine at least every 48 hours if not already ordered.  Follow for continued vancomycin needs, clinical response, and signs/symptoms of toxicity.       Samantha Mccarty, PharmD

## 2025-03-21 NOTE — PROGRESS NOTES
Pharmacy Medication History Review    Mishel Scanlon is a 74 y.o. female. Pharmacy reviewed the patient's [id number] medications and allergies for accuracy.    Medications ADDED:  Isoniazid 300mg  Mirtazapine 15mg  Nitrofurantoin macrocrystal 100mg   Rifabutin 150mg  Vitamin B6 oral  Docusate 100mg   Acetaminophen 500mg  Medications CHANGED:  none  Medications REMOVED:   Vitamin C 1000mg  Vitamin D3 2000unit  Vitamin E 400unit     The list below reflects the updated PTA list. Comments regarding how patient may be taking medications differently can be found in the Admit Orders Activity  Prior to Admission Medications   Prescriptions Last Dose Informant   Eliquis 5 mg tablet 3/20/2025 family   Sig: Take 1 tablet (5 mg) by mouth twice a day.   acetaminophen (Tylenol) 500 mg tablet 3/20/2025 family   Sig: Take 2 tablets (1,000 mg) by mouth every 6 hours if needed for mild pain (1 - 3).   docusate sodium (Colace) 100 mg capsule 3/20/2025 family   Sig: Take 1 capsule (100 mg) by mouth once daily.   isoniazid (Nydrazid) 300 mg tablet 3/20/2025 family   Sig: Take 1 tablet (300 mg) by mouth once daily.   lisinopril 20 mg tablet 3/20/2025 family   Sig: TAKE ONE TABLET BY MOUTH EVERY DAY IN THE MORNING   metoprolol tartrate (Lopressor) 50 mg tablet 3/20/2025 family   Sig: Take 1 tablet by mouth 2 times a day.   mirtazapine (Remeron) 15 mg tablet  family   Sig: Take 0.5 tablets (7.5 mg) by mouth once daily at bedtime.   nitrofurantoin, macrocrystal-monohydrate, (Macrobid) 100 mg capsule 3/20/2025 family   Sig: Take 1 capsule (100 mg) by mouth 2 times a day.   pyridoxine HCl, vitamin B6, (VITAMIN B-6 ORAL) 3/20/2025 family   Sig: Take 1 each by mouth once daily.   rifabutin (Mycobutin) 150 mg capsule 3/20/2025 family   Sig: Take 2 capsules (300 mg) by mouth once daily.   traMADol (Ultram) 50 mg tablet 3/20/2025 family   Sig: Take 1 tablet by mouth two times a day as needed for pain for up to 30 days. Do not start before  December 17, 2023.      Facility-Administered Medications: None        The list below reflects the updated allergy list. Please review each documented allergy for additional clarification and justification.  Allergies  Reviewed by Niurka John CPhT on 3/21/2025        Severity Reactions Comments    Corticosteroids (glucocorticoids) Not Specified Other Please be cautious with steroid use due to history of osteoporosis    Denosumab Not Specified Other Osteonecrosis of the jaw            Pharmacy has been updated to [facility].    Sources used to complete the med history include dispense history, PTA medication list, patient/family interview. Family is a good historian    Below are additional concerns with the patient's PTA list.  None     Niurka John CPhT-Adv  Please reach out via [employer] Secure Chat for questions

## 2025-03-22 LAB
ALBUMIN SERPL BCP-MCNC: 3.1 G/DL (ref 3.4–5)
ALP SERPL-CCNC: 103 U/L (ref 33–136)
ALT SERPL W P-5'-P-CCNC: 9 U/L (ref 7–45)
ANION GAP SERPL CALCULATED.3IONS-SCNC: 13 MMOL/L (ref 10–20)
AST SERPL W P-5'-P-CCNC: 30 U/L (ref 9–39)
BASOPHILS # BLD AUTO: 0.01 X10*3/UL (ref 0–0.1)
BASOPHILS NFR BLD AUTO: 0.4 %
BILIRUB SERPL-MCNC: 0.6 MG/DL (ref 0–1.2)
BUN SERPL-MCNC: 12 MG/DL (ref 6–23)
CALCIUM SERPL-MCNC: 8.3 MG/DL (ref 8.6–10.3)
CHLORIDE SERPL-SCNC: 107 MMOL/L (ref 98–107)
CO2 SERPL-SCNC: 22 MMOL/L (ref 21–32)
CREAT SERPL-MCNC: 0.71 MG/DL (ref 0.5–1.05)
EGFRCR SERPLBLD CKD-EPI 2021: 88 ML/MIN/1.73M*2
EOSINOPHIL # BLD AUTO: 0.03 X10*3/UL (ref 0–0.4)
EOSINOPHIL NFR BLD AUTO: 1.3 %
ERYTHROCYTE [DISTWIDTH] IN BLOOD BY AUTOMATED COUNT: 12.2 % (ref 11.5–14.5)
GLUCOSE SERPL-MCNC: 67 MG/DL (ref 74–99)
HCT VFR BLD AUTO: 35 % (ref 36–46)
HGB BLD-MCNC: 11.1 G/DL (ref 12–16)
HOLD SPECIMEN: NORMAL
IMM GRANULOCYTES # BLD AUTO: 0.01 X10*3/UL (ref 0–0.5)
IMM GRANULOCYTES NFR BLD AUTO: 0.4 % (ref 0–0.9)
LYMPHOCYTES # BLD AUTO: 0.5 X10*3/UL (ref 0.8–3)
LYMPHOCYTES NFR BLD AUTO: 21.6 %
MCH RBC QN AUTO: 31.2 PG (ref 26–34)
MCHC RBC AUTO-ENTMCNC: 31.7 G/DL (ref 32–36)
MCV RBC AUTO: 98 FL (ref 80–100)
MONOCYTES # BLD AUTO: 0.33 X10*3/UL (ref 0.05–0.8)
MONOCYTES NFR BLD AUTO: 14.3 %
NEUTROPHILS # BLD AUTO: 1.43 X10*3/UL (ref 1.6–5.5)
NEUTROPHILS NFR BLD AUTO: 62 %
NRBC BLD-RTO: 0 /100 WBCS (ref 0–0)
PLATELET # BLD AUTO: 193 X10*3/UL (ref 150–450)
POTASSIUM SERPL-SCNC: 3.7 MMOL/L (ref 3.5–5.3)
PROT SERPL-MCNC: 5.5 G/DL (ref 6.4–8.2)
RBC # BLD AUTO: 3.56 X10*6/UL (ref 4–5.2)
SODIUM SERPL-SCNC: 138 MMOL/L (ref 136–145)
VANCOMYCIN SERPL-MCNC: 12.9 UG/ML (ref 5–20)
WBC # BLD AUTO: 2.3 X10*3/UL (ref 4.4–11.3)

## 2025-03-22 PROCEDURE — 80053 COMPREHEN METABOLIC PANEL: CPT | Performed by: INTERNAL MEDICINE

## 2025-03-22 PROCEDURE — 99223 1ST HOSP IP/OBS HIGH 75: CPT | Performed by: STUDENT IN AN ORGANIZED HEALTH CARE EDUCATION/TRAINING PROGRAM

## 2025-03-22 PROCEDURE — 99233 SBSQ HOSP IP/OBS HIGH 50: CPT | Performed by: INTERNAL MEDICINE

## 2025-03-22 PROCEDURE — 2500000001 HC RX 250 WO HCPCS SELF ADMINISTERED DRUGS (ALT 637 FOR MEDICARE OP): Performed by: INTERNAL MEDICINE

## 2025-03-22 PROCEDURE — 2500000004 HC RX 250 GENERAL PHARMACY W/ HCPCS (ALT 636 FOR OP/ED): Performed by: INTERNAL MEDICINE

## 2025-03-22 PROCEDURE — 36415 COLL VENOUS BLD VENIPUNCTURE: CPT | Performed by: INTERNAL MEDICINE

## 2025-03-22 PROCEDURE — 1210000001 HC SEMI-PRIVATE ROOM DAILY

## 2025-03-22 PROCEDURE — 2500000005 HC RX 250 GENERAL PHARMACY W/O HCPCS: Performed by: INTERNAL MEDICINE

## 2025-03-22 PROCEDURE — 80202 ASSAY OF VANCOMYCIN: CPT | Performed by: INTERNAL MEDICINE

## 2025-03-22 PROCEDURE — 85025 COMPLETE CBC W/AUTO DIFF WBC: CPT | Performed by: INTERNAL MEDICINE

## 2025-03-22 RX ORDER — OLANZAPINE 10 MG/2ML
2.5 INJECTION, POWDER, FOR SOLUTION INTRAMUSCULAR ONCE AS NEEDED
Status: ACTIVE | OUTPATIENT
Start: 2025-03-22

## 2025-03-22 RX ORDER — TRAMADOL HYDROCHLORIDE 50 MG/1
50 TABLET ORAL EVERY 8 HOURS PRN
Status: DISPENSED | OUTPATIENT
Start: 2025-03-22

## 2025-03-22 RX ADMIN — APIXABAN 5 MG: 5 TABLET, FILM COATED ORAL at 08:25

## 2025-03-22 RX ADMIN — TRAMADOL HYDROCHLORIDE 50 MG: 50 TABLET, COATED ORAL at 21:33

## 2025-03-22 RX ADMIN — METOPROLOL TARTRATE 50 MG: 50 TABLET, FILM COATED ORAL at 08:25

## 2025-03-22 RX ADMIN — MIRTAZAPINE 7.5 MG: 7.5 TABLET, FILM COATED ORAL at 22:21

## 2025-03-22 RX ADMIN — TRAMADOL HYDROCHLORIDE 50 MG: 50 TABLET, COATED ORAL at 10:41

## 2025-03-22 RX ADMIN — METOPROLOL TARTRATE 50 MG: 50 TABLET, FILM COATED ORAL at 21:33

## 2025-03-22 RX ADMIN — ISONIAZID 300 MG: 300 TABLET ORAL at 08:25

## 2025-03-22 RX ADMIN — APIXABAN 5 MG: 5 TABLET, FILM COATED ORAL at 21:33

## 2025-03-22 RX ADMIN — FAMOTIDINE 20 MG: 20 TABLET, FILM COATED ORAL at 08:25

## 2025-03-22 RX ADMIN — LISINOPRIL 20 MG: 20 TABLET ORAL at 08:25

## 2025-03-22 RX ADMIN — RIFABUTIN 300 MG: 150 CAPSULE ORAL at 08:25

## 2025-03-22 RX ADMIN — PIPERACILLIN SODIUM AND TAZOBACTAM SODIUM 3.38 G: 3; .375 INJECTION, SOLUTION INTRAVENOUS at 16:59

## 2025-03-22 RX ADMIN — PIPERACILLIN SODIUM AND TAZOBACTAM SODIUM 3.38 G: 3; .375 INJECTION, SOLUTION INTRAVENOUS at 03:50

## 2025-03-22 RX ADMIN — PIPERACILLIN SODIUM AND TAZOBACTAM SODIUM 3.38 G: 3; .375 INJECTION, SOLUTION INTRAVENOUS at 21:33

## 2025-03-22 RX ADMIN — PYRIDOXINE HCL TAB 50 MG 50 MG: 50 TAB at 08:29

## 2025-03-22 RX ADMIN — PIPERACILLIN SODIUM AND TAZOBACTAM SODIUM 3.38 G: 3; .375 INJECTION, SOLUTION INTRAVENOUS at 09:42

## 2025-03-22 RX ADMIN — Medication 3 MG: at 21:33

## 2025-03-22 RX ADMIN — FAMOTIDINE 20 MG: 20 TABLET, FILM COATED ORAL at 21:33

## 2025-03-22 SDOH — ECONOMIC STABILITY: TRANSPORTATION INSECURITY: IN THE PAST 12 MONTHS, HAS LACK OF TRANSPORTATION KEPT YOU FROM MEDICAL APPOINTMENTS OR FROM GETTING MEDICATIONS?: NO

## 2025-03-22 SDOH — SOCIAL STABILITY: SOCIAL NETWORK
DO YOU BELONG TO ANY CLUBS OR ORGANIZATIONS SUCH AS CHURCH GROUPS, UNIONS, FRATERNAL OR ATHLETIC GROUPS, OR SCHOOL GROUPS?: NO

## 2025-03-22 SDOH — ECONOMIC STABILITY: FOOD INSECURITY: HOW HARD IS IT FOR YOU TO PAY FOR THE VERY BASICS LIKE FOOD, HOUSING, MEDICAL CARE, AND HEATING?: SOMEWHAT HARD

## 2025-03-22 SDOH — ECONOMIC STABILITY: INCOME INSECURITY: IN THE PAST 12 MONTHS HAS THE ELECTRIC, GAS, OIL, OR WATER COMPANY THREATENED TO SHUT OFF SERVICES IN YOUR HOME?: NO

## 2025-03-22 SDOH — SOCIAL STABILITY: SOCIAL INSECURITY: WITHIN THE LAST YEAR, HAVE YOU BEEN HUMILIATED OR EMOTIONALLY ABUSED IN OTHER WAYS BY YOUR PARTNER OR EX-PARTNER?: NO

## 2025-03-22 SDOH — SOCIAL STABILITY: SOCIAL INSECURITY
WITHIN THE LAST YEAR, HAVE YOU BEEN KICKED, HIT, SLAPPED, OR OTHERWISE PHYSICALLY HURT BY YOUR PARTNER OR EX-PARTNER?: NO

## 2025-03-22 SDOH — ECONOMIC STABILITY: FOOD INSECURITY: WITHIN THE PAST 12 MONTHS, YOU WORRIED THAT YOUR FOOD WOULD RUN OUT BEFORE YOU GOT THE MONEY TO BUY MORE.: NEVER TRUE

## 2025-03-22 SDOH — ECONOMIC STABILITY: HOUSING INSECURITY: IN THE PAST 12 MONTHS, HOW MANY TIMES HAVE YOU MOVED WHERE YOU WERE LIVING?: 13

## 2025-03-22 SDOH — SOCIAL STABILITY: SOCIAL NETWORK: HOW OFTEN DO YOU ATTEND CHURCH OR RELIGIOUS SERVICES?: NEVER

## 2025-03-22 SDOH — SOCIAL STABILITY: SOCIAL INSECURITY: WITHIN THE LAST YEAR, HAVE YOU BEEN AFRAID OF YOUR PARTNER OR EX-PARTNER?: NO

## 2025-03-22 SDOH — HEALTH STABILITY: MENTAL HEALTH
DO YOU FEEL STRESS - TENSE, RESTLESS, NERVOUS, OR ANXIOUS, OR UNABLE TO SLEEP AT NIGHT BECAUSE YOUR MIND IS TROUBLED ALL THE TIME - THESE DAYS?: ONLY A LITTLE

## 2025-03-22 SDOH — ECONOMIC STABILITY: HOUSING INSECURITY: AT ANY TIME IN THE PAST 12 MONTHS, WERE YOU HOMELESS OR LIVING IN A SHELTER (INCLUDING NOW)?: NO

## 2025-03-22 SDOH — ECONOMIC STABILITY: HOUSING INSECURITY: IN THE LAST 12 MONTHS, WAS THERE A TIME WHEN YOU WERE NOT ABLE TO PAY THE MORTGAGE OR RENT ON TIME?: NO

## 2025-03-22 SDOH — HEALTH STABILITY: PHYSICAL HEALTH
HOW OFTEN DO YOU NEED TO HAVE SOMEONE HELP YOU WHEN YOU READ INSTRUCTIONS, PAMPHLETS, OR OTHER WRITTEN MATERIAL FROM YOUR DOCTOR OR PHARMACY?: NEVER

## 2025-03-22 SDOH — HEALTH STABILITY: PHYSICAL HEALTH: ON AVERAGE, HOW MANY MINUTES DO YOU ENGAGE IN EXERCISE AT THIS LEVEL?: 0 MIN

## 2025-03-22 SDOH — SOCIAL STABILITY: SOCIAL INSECURITY
WITHIN THE LAST YEAR, HAVE YOU BEEN RAPED OR FORCED TO HAVE ANY KIND OF SEXUAL ACTIVITY BY YOUR PARTNER OR EX-PARTNER?: NO

## 2025-03-22 SDOH — SOCIAL STABILITY: SOCIAL NETWORK: HOW OFTEN DO YOU GET TOGETHER WITH FRIENDS OR RELATIVES?: NEVER

## 2025-03-22 SDOH — SOCIAL STABILITY: SOCIAL INSECURITY: ARE YOU MARRIED, WIDOWED, DIVORCED, SEPARATED, NEVER MARRIED, OR LIVING WITH A PARTNER?: DIVORCED

## 2025-03-22 SDOH — ECONOMIC STABILITY: FOOD INSECURITY: WITHIN THE PAST 12 MONTHS, THE FOOD YOU BOUGHT JUST DIDN'T LAST AND YOU DIDN'T HAVE MONEY TO GET MORE.: NEVER TRUE

## 2025-03-22 SDOH — HEALTH STABILITY: PHYSICAL HEALTH: ON AVERAGE, HOW MANY DAYS PER WEEK DO YOU ENGAGE IN MODERATE TO STRENUOUS EXERCISE (LIKE A BRISK WALK)?: 0 DAYS

## 2025-03-22 SDOH — SOCIAL STABILITY: SOCIAL NETWORK: HOW OFTEN DO YOU ATTEND MEETINGS OF THE CLUBS OR ORGANIZATIONS YOU BELONG TO?: NEVER

## 2025-03-22 SDOH — SOCIAL STABILITY: SOCIAL NETWORK: IN A TYPICAL WEEK, HOW MANY TIMES DO YOU TALK ON THE PHONE WITH FAMILY, FRIENDS, OR NEIGHBORS?: NEVER

## 2025-03-22 ASSESSMENT — COGNITIVE AND FUNCTIONAL STATUS - GENERAL
HELP NEEDED FOR BATHING: A LITTLE
MOVING TO AND FROM BED TO CHAIR: A LOT
PERSONAL GROOMING: A LITTLE
MOBILITY SCORE: 14
EATING MEALS: A LITTLE
STANDING UP FROM CHAIR USING ARMS: A LOT
CLIMB 3 TO 5 STEPS WITH RAILING: A LOT
MOVING FROM LYING ON BACK TO SITTING ON SIDE OF FLAT BED WITH BEDRAILS: A LITTLE
DRESSING REGULAR LOWER BODY CLOTHING: A LITTLE
WALKING IN HOSPITAL ROOM: A LOT
TURNING FROM BACK TO SIDE WHILE IN FLAT BAD: A LITTLE
TOILETING: A LITTLE
DAILY ACTIVITIY SCORE: 18
DRESSING REGULAR UPPER BODY CLOTHING: A LITTLE

## 2025-03-22 ASSESSMENT — PAIN DESCRIPTION - DESCRIPTORS
DESCRIPTORS: ACHING;DISCOMFORT
DESCRIPTORS: ACHING;DISCOMFORT
DESCRIPTORS: ACHING

## 2025-03-22 ASSESSMENT — PAIN - FUNCTIONAL ASSESSMENT
PAIN_FUNCTIONAL_ASSESSMENT: 0-10

## 2025-03-22 ASSESSMENT — PAIN SCALES - GENERAL
PAINLEVEL_OUTOF10: 8
PAINLEVEL_OUTOF10: 6
PAINLEVEL_OUTOF10: 0 - NO PAIN
PAINLEVEL_OUTOF10: 0 - NO PAIN

## 2025-03-22 ASSESSMENT — PAIN SCALES - PAIN ASSESSMENT IN ADVANCED DEMENTIA (PAINAD): TOTALSCORE: MEDICATION (SEE MAR)

## 2025-03-22 ASSESSMENT — ACTIVITIES OF DAILY LIVING (ADL): LACK_OF_TRANSPORTATION: NO

## 2025-03-22 ASSESSMENT — PAIN DESCRIPTION - LOCATION
LOCATION: BACK
LOCATION: GENERALIZED

## 2025-03-22 NOTE — CONSULTS
Pulmonary Consultation Note   Subjective    Reason for Consult: pleural effusion    History of Present Illness:  Mishel Scanlon is a 74 y.o. year old female patient admitted on 3/21/2025 with urinary tract infection causing increased weakness and fatigue at home.  Patient has a complex pulmonary history.  She was exposed to TB in spring of last year and was initially put on prophylaxis. In the fall she developed a pleural effusion and underwent thoracentesis and bronchoscopy.  Effusion was culture and smear negative negative for AFB however BAL fluid was positive for TB by PCR and patient has been on a 3 drug regimen now condense to 2 drugs and has almost completed therapy.  She will complete it in April of this year.  She is followed by infectious disease at [facility] and earlier this week when she developed urinary tract infection symptoms had a UA and was started on nitrofurantoin however her symptoms worsened and family brought her to the [facility] ED.  Pulmonary is consulted when small to moderate pleural effusion was seen on CT scan.  Of note she has had multiple chest x-rays since the effusion was tapped this fall that have shown the effusion to wax and wane.  She currently has no pulmonary symptoms.    Past Medical History  She has a past medical history of A-fib (Multi), Breast cancer (Multi), HTN (hypertension), Hyperparathyroid bone disease (Multi), Lumbar stenosis, Neck pain, and OP (osteoporosis).    Surgical History  She has a past surgical history that includes Kidney surgery and Breast surgery.     Social History  Social History     Tobacco Use    Smoking status: Never     Passive exposure: Never    Smokeless tobacco: Never   Vaping Use    Vaping status: Never Used   Substance Use Topics    Alcohol use: Never    Drug use: Never       Family History  Family History   Problem Relation Name Age of Onset    Breast cancer Mother      Heart disease Father          Allergies  Corticosteroids  "(glucocorticoids) and Denosumab    Review of Systems:    Meds    Scheduled medications  apixaban, 5 mg, oral, BID  famotidine, 20 mg, oral, BID   Or  famotidine, 20 mg, intravenous, BID  isoniazid, 300 mg, oral, Daily  lisinopril, 20 mg, oral, Daily  metoprolol tartrate, 50 mg, oral, BID  mirtazapine, 7.5 mg, oral, Nightly  piperacillin-tazobactam, 3.375 g, intravenous, q6h  pyridoxine, 50 mg, oral, Daily  rifabutin, 300 mg, oral, Daily      Continuous medications     PRN medications  PRN medications: acetaminophen **OR** acetaminophen **OR** acetaminophen, acetaminophen **OR** acetaminophen **OR** acetaminophen, labetaloL, melatonin, ondansetron **OR** ondansetron, traMADol     Objective    Blood pressure 162/82, pulse 76, temperature 37.6 °C (99.7 °F), temperature source Oral, resp. rate 18, height 1.626 m (5' 4\"), weight 69.4 kg (153 lb), SpO2 99%.   Physical Exam   GENERAL: normal appearance. well nourished. No respiratory distress  HEAD/SINUSES: no sinus tenderness  OROPHARYNX: Moist mucosa, no thrush or lesions  NECK: no JVD, midline trachea without stridor. Thyroid not enlarged  LYMPH NODES : none felt in the cervical, submandibular or supraclavicular regions  LUNGS: Symmetric chest. Good excursion. Equal breath sounds. no wheezing. no crackles or rhonchi  CARDIAC: normal S1 and S2; no gallops, II-III/VI holosystolic murmur. Regular rate and rhythm  EXTREMITIES: Trace edema, no varicose veins  NEURO: confused, CN reflexes and motor strength.   SKIN: Skin turgor normal. No rashes or lesions.   PSYCH: Normal affect    Intake/Output Summary (Last 24 hours) at 3/22/2025 1616  Last data filed at 3/22/2025 1019  Gross per 24 hour   Intake 1200 ml   Output 400 ml   Net 800 ml     Labs:   Results from last 72 hours   Lab Units 03/22/25  0536 03/21/25  0915   SODIUM mmol/L 138 141   POTASSIUM mmol/L 3.7 3.6   CHLORIDE mmol/L 107 106   CO2 mmol/L 22 29   BUN mg/dL 12 16   CREATININE mg/dL 0.71 0.67   GLUCOSE mg/dL 67* " 88   CALCIUM mg/dL 8.3* 8.7   ANION GAP mmol/L 13 10   EGFR mL/min/1.73m*2 88 >90      Results from last 72 hours   Lab Units 03/22/25  0536 03/21/25  0915   WBC AUTO x10*3/uL 2.3* 4.8   HEMOGLOBIN g/dL 11.1* 11.5*   HEMATOCRIT % 35.0* 36.5   PLATELETS AUTO x10*3/uL 193 221   NEUTROS PCT AUTO % 62.0 79.6   LYMPHS PCT AUTO % 21.6 12.4   MONOS PCT AUTO % 14.3 7.2   EOS PCT AUTO % 1.3 0.4      Micro/ID:   Lab Results   Component Value Date    BLOODCULT No growth at 1 day 03/21/2025    BLOODCULT No growth at 1 day 03/21/2025     Summary of key imaging results from the last 24 hours  CXR 3/21/25  IMPRESSION:  New small left pleural effusion, superimposed infiltrate should be  considered given patient presentation of sepsis. Further evaluation  with CT chest could be obtained as per clinical need.    Impression   Mishel Scanlon is a 74 y.o. year old female patient is being seen by the pulmonary service for pleural effusion in the setting of TB, currently on treatment, no longer requiring isolation per health department.   Chronic pleural effusion - currently on room air with no pulmonary symptoms.    Recommendations   As follows:  Pleural effusion is chronic and has waxed and waned over multiple chest x-rays over the last few months. She underwent thoracentesis at the time her TB was diagnosed and smear and culture were both negative. She has been on TB therapy observed by the health department since the fall. I do not think she needs any further intervention for this effusion. Should she develop an oxygen need or respiratory distress we can reassess.         Pulmonary will sign off for now. Please reach out with any questions or concerns.       Maddy Tucker MD PhD   03/22/25 at 4:16 PM     Disclaimer: Documentation completed with the information available at the time of input. Parts of this note may have been scribed or generated using voice dictation software, Dragon.  Homophonic errors may exist.  Please contact  me directly if clarification is needed. The times in the chart may not be reflective of actual patient care times, interventions, or procedures. Documentation occurs after the physical care of the patient.

## 2025-03-22 NOTE — CONSULTS
INFECTIOUS DISEASES CONSULTATION NOTE      Referred by Balta Goyal MD    Reason For Consult  History of pulmonary tuberculosis  Weakness/UTI    History Of Present Illness  Mishel Scanlon is a 74 y.o. female presenting with shaking and generalized weakness.  She has a complex history of pulmonary tuberculosis, see details below, and has been under the close care of Dr. Kandy Dumas. [facility].  She is in the final weeks of a regimen of isoniazid and rifabutin for pulmonary tuberculosis.  Dr. Dumas's notes indicate that she has, for at least the last few weeks, had poor energy, increased sleep, poor appetite, and urinary urgency and incontinence (the latter a chronic problem).  Urinalysis was obtained on 3/18 and was notable for pyuria and the patient was started on nitrofurantoin.  Cultures grew mixed nichol, and her condition did not improve.  Family called an ambulance and had her brought here on 3/21 because of persistence of these symptoms.  Patient states that she is not noting any new urinary symptoms.  No vomiting, diarrhea, flank pain, fever, dyspnea, cough, hemoptysis, sputum production, chest pain.  For the last month or so she has been having episodes of shaking.  She wonders whether her anti-TB medications are the cause of the symptoms and is hoping that they might be discontinued sooner     Past Medical History  Regarding tuberculosis, she was hospitalized in March 2024 and apparently was exposed to tuberculosis at that time and had a positive IGRA TB test.  She subsequently was noted to have a very large left pleural effusion with compressive atelectasis of most of the left lower lobe.  She had an extensive evaluation for this problem and was found to have a lymphocytic pleural effusion that was felt to be compatible with tuberculosis, with negative cytology, negative cultures, pH of 7.6, protein 4.1.  In October 2024 she underwent a bronchoscopy and BAL and AFB smears were negative but a PCR was  positive for tuberculosis.  She was started on isoniazid, pyridoxine, ethambutol, pyrazinamide, and rifabutin, avoiding rifampin because of her Eliquis.  Pleural effusion has improved.  Anti-TB regimen has been consolidated to isoniazid (pyridoxine) and rifabutin, and this has been administered by the [facility] department.  She is due to discontinue this regimen in April 2025    Breast cancer  Hypertension  Hyperparathyroidism  Lumbar stenosis  Atrial fibrillation     Social History  Does not abuse tobacco or alcohol  Attentive and supportive family    Family History  No family exposure to known communicable illnesses    Allergies  Corticosteroids (glucocorticoids) and Denosumab     Review of Systems  Detailed review of systems completed.  No significant additional positives beyond what is mentioned above    Physical Exam  Vital signs:  Visit Vitals  BP (!) 148/92 (BP Location: Left arm, Patient Position: Lying)   Pulse 70   Temp 36.8 °C (98.2 °F) (Oral)   Resp 18      General: Appears weak and tired but not toxic.  Occasional diffuse shaking of the upper extremities  HEENT:  No scleral icterus or conjunctival suffusion, oral mucosa moist  Nodes:  Negative  Lungs:  Clear to auscultation, diminished at the bases  Breasts:  Not examined  Heart:  S1, S2 normal, no pathologic murmur appreciated  Abdomen:  Soft, nontender. No palpable organs or masses  Back:  No spinal or CVA tenderness  Genitalia:  Not examined  Extremities:  No cords, phlebitis, cellulitis.  No digital clubbing  Neurologic:  Alert.  Grossly non-focal.      Relevant Results  Results from last 72 hours   Lab Units 03/21/25  0915   WBC AUTO x10*3/uL 4.8   HEMOGLOBIN g/dL 11.5*   HEMATOCRIT % 36.5   PLATELETS AUTO x10*3/uL 221   NEUTROS PCT AUTO % 79.6   LYMPHS PCT AUTO % 12.4   MONOS PCT AUTO % 7.2   EOS PCT AUTO % 0.4     Results from last 72 hours   Lab Units 03/21/25  0915   CREATININE mg/dL 0.67   ANION GAP mmol/L 10   EGFR mL/min/1.73m*2 >90      Results from last 72 hours   Lab Units 03/21/25  0915   AST U/L 23   ALT U/L 15   ALK PHOS U/L 134   BILIRUBIN TOTAL mg/dL 0.4     Urinalysis: Pyuria resolved  Microbiology:  Blood (3/21): Pending X2  Urine: Pending  Imaging:  CXR images personally reviewed: Left pleural effusion, to my eye appears somewhat smaller than the most recent [facility] film  CT abdomen and pelvis: Improving left pleural effusion, mild stercoral colitis    ASSESSMENT:  Rule out urinary tract infection  Patient's most recent urinalysis prior to this admission showed pyuria and mixed nichol, and she has chronic urinary complaints.  For now, we will maintain Zosyn and check post-void residual urine    Pulmonary tuberculosis  Details as noted above.  There has been radiographic improvement and the patient is nearing completion of the intended course of therapy.  I will review with Dr. Dumas on Monday, 3/24, and we will maintain the current regimen at this time.  No dropfoot or airborne isolation is required      PLANS:  -   Continue Zosyn pending further observation and incubation of cultures  -   Continue isoniazid, pyridoxine, rifabutin  -   No need for isolation     THANK YOU FOR ASKING ME TO ASSIST YOU IN THE CARE OF YOUR PATIENT  Discussed with Dr. Goyal  Will review 3/24 with Dr. Alesia Solomon MD  [facility]  Office:  [phone]

## 2025-03-22 NOTE — PROGRESS NOTES
Vancomycin Dosing by Pharmacy- Cessation of Therapy    Consult to pharmacy for vancomycin dosing has been discontinued by the prescriber, pharmacy will sign off at this time.    Please call pharmacy if there are further questions or re-enter a consult if vancomycin is resumed.     Guido Young, PharmD

## 2025-03-22 NOTE — CARE PLAN
The patient's goals for the shift include improve uti symptoms    The clinical goals for the shift include maintain safety    Problem: Pain - Adult  Goal: Verbalizes/displays adequate comfort level or baseline comfort level  Outcome: Progressing     Problem: Safety - Adult  Goal: Free from fall injury  Outcome: Progressing     Problem: Discharge Planning  Goal: Discharge to home or other facility with appropriate resources  Outcome: Progressing     Problem: Chronic Conditions and Co-morbidities  Goal: Patient's chronic conditions and co-morbidity symptoms are monitored and maintained or improved  Outcome: Progressing     Problem: Nutrition  Goal: Nutrient intake appropriate for maintaining nutritional needs  Outcome: Progressing     Problem: Skin  Goal: Decreased wound size/increased tissue granulation at next dressing change  Outcome: Progressing  Flowsheets (Taken 3/22/2025 1331)  Decreased wound size/increased tissue granulation at next dressing change: Protective dressings over bony prominences  Goal: Participates in plan/prevention/treatment measures  Outcome: Progressing  Flowsheets (Taken 3/22/2025 1331)  Participates in plan/prevention/treatment measures:   Increase activity/out of bed for meals   Elevate heels  Goal: Prevent/manage excess moisture  Outcome: Progressing  Flowsheets (Taken 3/22/2025 1331)  Prevent/manage excess moisture:   Monitor for/manage infection if present   Cleanse incontinence/protect with barrier cream   Moisturize dry skin  Goal: Prevent/minimize sheer/friction injuries  Outcome: Progressing  Flowsheets (Taken 3/22/2025 1331)  Prevent/minimize sheer/friction injuries:   HOB 30 degrees or less   Turn/reposition every 2 hours/use positioning/transfer devices   Increase activity/out of bed for meals  Goal: Promote/optimize nutrition  Outcome: Progressing  Flowsheets (Taken 3/22/2025 1331)  Promote/optimize nutrition:   Offer water/supplements/favorite foods   Consume > 50%  meals/supplements  Goal: Promote skin healing  Outcome: Progressing  Flowsheets (Taken 3/22/2025 7782)  Promote skin healing:   Rotate device position/do not position patient on device   Protective dressings over bony prominences     Problem: Pain  Goal: Takes deep breaths with improved pain control throughout the shift  Outcome: Progressing  Goal: Turns in bed with improved pain control throughout the shift  Outcome: Progressing  Goal: Walks with improved pain control throughout the shift  Outcome: Progressing  Goal: Performs ADL's with improved pain control throughout shift  Outcome: Progressing  Goal: Participates in PT with improved pain control throughout the shift  Outcome: Progressing  Goal: Free from opioid side effects throughout the shift  Outcome: Progressing  Goal: Free from acute confusion related to pain meds throughout the shift  Outcome: Progressing

## 2025-03-22 NOTE — PROGRESS NOTES
Mishel Scanlon is a 74 y.o. female on day 1 of admission presenting with Pyelonephritis.      Subjective   Patient complained of feeling weak  She has been afebrile during the night.  She denied abdominal pain.  She complained of old left flank pain.         Objective     Last Recorded Vitals  /83 (BP Location: Left arm, Patient Position: Lying)   Pulse 76   Temp 36.6 °C (97.9 °F) (Oral)   Resp 18   Wt 69.4 kg (153 lb)   SpO2 100%   Intake/Output last 3 Shifts:    Intake/Output Summary (Last 24 hours) at 3/22/2025 0913  Last data filed at 3/22/2025 0525  Gross per 24 hour   Intake 1100 ml   Output 400 ml   Net 700 ml       Admission Weight  Weight: 69.4 kg (153 lb) (03/21/25 0846)    Daily Weight  03/21/25 : 69.4 kg (153 lb)    Image Results  ECG 12 lead  Sinus tachycardia  Otherwise normal ECG  No previous ECGs available  CT abdomen pelvis w IV contrast  Narrative: Interpreted By:  Tommy Cavanaugh,   STUDY:  CT ABDOMEN PELVIS W IV CONTRAST; 3/21/2025 11:29 am      INDICATION:  Signs/Symptoms:uti, back  pain, assess for pyelo      COMPARISON:  None.      ACCESSION NUMBER(S):  [id number]      ORDERING CLINICIAN:  JENINFER HILLS      TECHNIQUE:  Initial contrast injection failed failed and unenhanced images were  obtained through the abdomen and pelvis. Following intravenous  administration of nonionic contrast, additional spiral axial images  were obtained from the dome of the diaphragm through the symphysis  pubis. Oral contrast was not administered.  Soft tissue and lung windows were evaluated.  Sagittal and Coronal reformatted images were also assessed.      All CT examinations are performed with one or more of the following  dose reduction techniques: Automated Exposure Control, adjustment of  mA and/or kV according to patient size, or use of iterative  reconstruction techniques.      FINDINGS:  Lung bases: Previously seen large left pleural effusion has improved,  however there is new small right  pleural effusion. Bibasilar  infiltrates/atelectatic changes are also noted. Liver: Normal in  size, with a couple of stable hypodensities anteriorly, too small to  characterize, most likely cysts. Gallbladder: Grossly unremarkable.  Spleen: Normal in size without focal lesions.  Pancreas: Suboptimally visualized, however no gross abnormality seen..  Adrenal glands: No focal lesions.  Kidneys and bladder: Both kidneys excrete contrast symmetrically,  without hydronephrosis or solid enhancing masses. Small bilateral  renal cysts are again present the largest in the lower pole of the  left kidney measuring 1.4 cm. There are no renal or ureteral stones.  The bladder is only partially distended and therefore suboptimally  evaluated. There is mild apparent bladder wall thickening and a  couple of possible small bladder diverticula.      Gastrointestinal tract and peritoneal cavity:  Copious amount of fecal material is noted in the colon, especially in  the rectum, however there is no bowel obstruction. Mild wall  thickening of the rectum and minimal associated stranding of the  adjacent mesenteric fat is again present, similar to prior, possibly  related to mild stercoral colitis. Few colonic diverticular present,  without acute diverticulitis. The appendix is not clearly visualized,  however there is no pericecal fat stranding to suggest acute  appendicitis. There is no free air or abnormal fluid collections in  the abdomen and pelvis.      The aorta is normal in caliber with atherosclerotic vascular  calcifications.. The SMA, SMV and portal vein are patent.  Pelvic reproductive organs: Unremarkable.      Osseous structures: There is moderate-to-marked levoscoliosis of the  thoracolumbar junction of the spine. Additionally there are  degenerative changes at multiple levels disc disease. Degenerative  changes also involve the hip joints bilaterally. No acute fracture or  subluxation is seen.      Impression: 1. Interval  improvement of previously seen large left effusion and  interval development of small right pleural effusion with bibasilar  infiltrates/atelectatic changes.  2. Chronic constipation with possible mild stercoral colitis  involving the rectum. Also few colonic diverticula without acute  diverticulitis.  3. Apparent mild wall thickening of the urinary bladder, possibly due  to incomplete distention, with possible small bladder diverticula.  4. Additional chronic findings again present as above.      Signed by: Tomym Cavanaugh 3/21/2025 11:57 AM  Dictation workstation:   [id number]  XR chest 1 view  Narrative: Interpreted By:  Jennifer Gomez,   STUDY:  XR CHEST 1 VIEW;  3/21/2025 9:33 am      INDICATION:  Signs/Symptoms:sepsis,  weakness.      COMPARISON:  07/26/2023      ACCESSION NUMBER(S):  [id number]      ORDERING CLINICIAN:  JENNIFER HILLS      FINDINGS:  AP radiograph of the chest was provided.              CARDIOMEDIASTINAL SILHOUETTE:  Cardiomediastinal silhouette is stable in size and configuration.      LUNGS:  New small left pleural effusion with retrocardiac opacification. The  right lung is clear. No pneumothorax or pulmonary edema.      ABDOMEN:  No remarkable upper abdominal findings.      BONES:  No acute osseous changes.      Impression: New small left pleural effusion, superimposed infiltrate should be  considered given patient presentation of sepsis. Further evaluation  with CT chest could be obtained as per clinical need.      Signed by: Jennifer Gomez 3/21/2025 10:11 AM  Dictation workstation:   [id number]      Physical Exam    General: In moderate distress,, cooperating during physical exam.  HEENT: Pupils are equal and reactive to light and commendation , oral mucosa moist, no JVD   Cardiovascular: PMI nondisplaced  Lungs: Diminished breath sound on left lung base  Abdomen: No hepatosplenomegaly appreciated, soft , not tender, positive bowel sounds, positive bowel movement.  Neuro: Alert and  oriented x3, strength in upper and lower extremities , sensation intact.  Musculoskeletal: Trace edema both lower extremity vascular: Pulses are intact in upper and lower extremities  Skin: No petechiae, ecchymosis or other stigmata for dermatology disease.     Assessment/Plan        Systemic inflammatory response syndrome  Probably secondary to UTI  Patient UA was normal but was started on antibiotics 1 day prior to admission  Check blood culture  Patient remained tachycardic tachypneic, urinary urgency and frequency  CT abdomen pelvis no evidence of hydronephrosis, thickening of urinary bladder  Evidence of left pleural effusion  Patient was started empirically on Zosyn  Discussed with LEON Stout on the case    Left pleural effusion  Dr Tucker on the case.  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    She was started on medication.  Patient is on isoniazid and rifabutin through April 4 according to her son  Discussed with ID on the case.  No need for isolation     Hypertension  Fairly controlled   resume her home medication  Patient is on lisinopril and metoprolol  Ordered labetalol for systolic blood pressure more than 180     History of breast cancer  S/p lumpectomy and radiation  Monitor close     Atrial fibrillation  Continue with metoprolol and Eliquis      Deconditioning  Precaution  Ambulate with assistant     Discussed in detail with her son Cristofer GREENE present in the room today  Patient is DNR CCA/DNI okay with ICU admission  Check CBC and RFP in AM.    Time spent with patient 50 minutes      Dottie Goyal MD

## 2025-03-22 NOTE — CARE PLAN
The patient's goals for the shift include improve uti symptoms    The clinical goals for the shift include no fall or injury by the end of shift    Problem: Safety - Adult  Goal: Free from fall injury  Outcome: Progressing     Problem: Pain - Adult  Goal: Verbalizes/displays adequate comfort level or baseline comfort level  Outcome: Progressing     Problem: Skin  Goal: Decreased wound size/increased tissue granulation at next dressing change  Outcome: Progressing     Problem: Skin  Goal: Participates in plan/prevention/treatment measures  Outcome: Progressing     Problem: Skin  Goal: Prevent/manage excess moisture  Outcome: Progressing

## 2025-03-22 NOTE — NURSING NOTE
2/22/2025    1950: Pt observed sundowning and severely confused with multiple attempts to get out of bed, Pt  cannot keep her telemetry monitor  on. She thinks that we are taking advantage of her by coming into her house and taking her items. Pt re-orientated but she is not comprehending, RN  spoke to Pt's Son Cristofer who confirms that Pt has dementia and sundowns. Pt also spoke to her Son. Bed alarm in place due to high risk of  fall and confusion. [facility] Hoffmann made aware of this situation.    2000: MD ordered Zyprexa IM prn.Nurse supervisor notified for sitter availability.    2030: Sitter at bedside, Pt calm and resting with no issues.

## 2025-03-22 NOTE — PROGRESS NOTES
Spiritual Care Visit  Spiritual Care Request    Reason for Visit:  Routine Visit: Introduction     Request Received From:       Focus of Care:  Visited With: Patient not available         Refer to :          Spiritual Care Assessment    Spiritual Assessment:                      Care Provided:       Sense of Community and or [employer] Affiliation:  Orthodox   Values/Beliefs  Spiritual Requests During Hospitalization: Mishel was not in her room when I tried to see her today.     Addressed Needs/Concerns and/or David Through:          Outcome:        Advance Directives:         Spiritual Care Annotation    Annotation:  Mishel was not in her room when I tried to see her today.  [place]

## 2025-03-23 VITALS
SYSTOLIC BLOOD PRESSURE: 154 MMHG | OXYGEN SATURATION: 97 % | DIASTOLIC BLOOD PRESSURE: 91 MMHG | BODY MASS INDEX: 26.12 KG/M2 | RESPIRATION RATE: 18 BRPM | HEIGHT: 64 IN | WEIGHT: 153 LBS | TEMPERATURE: 99 F | HEART RATE: 81 BPM

## 2025-03-23 LAB
ALBUMIN SERPL BCP-MCNC: 2.9 G/DL (ref 3.4–5)
ANION GAP SERPL CALCULATED.3IONS-SCNC: 12 MMOL/L
ATRIAL RATE: 105 BPM
BACTERIA BLD CULT: NORMAL
BACTERIA BLD CULT: NORMAL
BACTERIA UR CULT: NORMAL
BASOPHILS # BLD AUTO: 0.01 X10*3/UL (ref 0–0.1)
BASOPHILS NFR BLD AUTO: 0.4 %
BUN SERPL-MCNC: 10 MG/DL (ref 6–23)
CALCIUM SERPL-MCNC: 8.2 MG/DL (ref 8.6–10.3)
CHLORIDE SERPL-SCNC: 106 MMOL/L (ref 98–107)
CO2 SERPL-SCNC: 26 MMOL/L (ref 21–32)
CREAT SERPL-MCNC: 0.65 MG/DL (ref 0.5–1.05)
EGFRCR SERPLBLD CKD-EPI 2021: >90 ML/MIN/1.73M*2
EOSINOPHIL # BLD AUTO: 0.01 X10*3/UL (ref 0–0.4)
EOSINOPHIL NFR BLD AUTO: 0.4 %
ERYTHROCYTE [DISTWIDTH] IN BLOOD BY AUTOMATED COUNT: 11.9 % (ref 11.5–14.5)
GLUCOSE SERPL-MCNC: 76 MG/DL (ref 74–99)
HCT VFR BLD AUTO: 30.9 % (ref 36–46)
HGB BLD-MCNC: 10.3 G/DL (ref 12–16)
IMM GRANULOCYTES # BLD AUTO: 0.01 X10*3/UL (ref 0–0.5)
IMM GRANULOCYTES NFR BLD AUTO: 0.4 % (ref 0–0.9)
LYMPHOCYTES # BLD AUTO: 0.54 X10*3/UL (ref 0.8–3)
LYMPHOCYTES NFR BLD AUTO: 22.7 %
MAGNESIUM SERPL-MCNC: 1.62 MG/DL (ref 1.6–2.4)
MCH RBC QN AUTO: 31.3 PG (ref 26–34)
MCHC RBC AUTO-ENTMCNC: 33.3 G/DL (ref 32–36)
MCV RBC AUTO: 94 FL (ref 80–100)
MONOCYTES # BLD AUTO: 0.54 X10*3/UL (ref 0.05–0.8)
MONOCYTES NFR BLD AUTO: 22.7 %
NEUTROPHILS # BLD AUTO: 1.27 X10*3/UL (ref 1.6–5.5)
NEUTROPHILS NFR BLD AUTO: 53.4 %
NRBC BLD-RTO: 0 /100 WBCS (ref 0–0)
P AXIS: 49 DEGREES
P OFFSET: 209 MS
P ONSET: 152 MS
PHOSPHATE SERPL-MCNC: 3.4 MG/DL (ref 2.5–4.9)
PLATELET # BLD AUTO: 197 X10*3/UL (ref 150–450)
POTASSIUM SERPL-SCNC: 2.8 MMOL/L (ref 3.5–5.3)
PR INTERVAL: 132 MS
Q ONSET: 218 MS
QRS COUNT: 18 BEATS
QRS DURATION: 74 MS
QT INTERVAL: 336 MS
QTC CALCULATION(BAZETT): 444 MS
QTC FREDERICIA: 404 MS
R AXIS: 54 DEGREES
RBC # BLD AUTO: 3.29 X10*6/UL (ref 4–5.2)
SODIUM SERPL-SCNC: 141 MMOL/L (ref 136–145)
T AXIS: 55 DEGREES
T OFFSET: 386 MS
VENTRICULAR RATE: 105 BPM
WBC # BLD AUTO: 2.4 X10*3/UL (ref 4.4–11.3)

## 2025-03-23 PROCEDURE — 2500000001 HC RX 250 WO HCPCS SELF ADMINISTERED DRUGS (ALT 637 FOR MEDICARE OP): Performed by: INTERNAL MEDICINE

## 2025-03-23 PROCEDURE — 80069 RENAL FUNCTION PANEL: CPT | Performed by: INTERNAL MEDICINE

## 2025-03-23 PROCEDURE — 99232 SBSQ HOSP IP/OBS MODERATE 35: CPT | Performed by: INTERNAL MEDICINE

## 2025-03-23 PROCEDURE — 2500000005 HC RX 250 GENERAL PHARMACY W/O HCPCS: Performed by: INTERNAL MEDICINE

## 2025-03-23 PROCEDURE — 85025 COMPLETE CBC W/AUTO DIFF WBC: CPT | Performed by: INTERNAL MEDICINE

## 2025-03-23 PROCEDURE — 36415 COLL VENOUS BLD VENIPUNCTURE: CPT | Performed by: INTERNAL MEDICINE

## 2025-03-23 PROCEDURE — 1210000001 HC SEMI-PRIVATE ROOM DAILY

## 2025-03-23 PROCEDURE — 83735 ASSAY OF MAGNESIUM: CPT | Performed by: INTERNAL MEDICINE

## 2025-03-23 PROCEDURE — 2500000004 HC RX 250 GENERAL PHARMACY W/ HCPCS (ALT 636 FOR OP/ED): Performed by: INTERNAL MEDICINE

## 2025-03-23 RX ORDER — POTASSIUM CHLORIDE 1.5 G/1.58G
40 POWDER, FOR SOLUTION ORAL ONCE
Status: COMPLETED | OUTPATIENT
Start: 2025-03-23 | End: 2025-03-23

## 2025-03-23 RX ADMIN — METOPROLOL TARTRATE 50 MG: 50 TABLET, FILM COATED ORAL at 20:43

## 2025-03-23 RX ADMIN — PIPERACILLIN SODIUM AND TAZOBACTAM SODIUM 3.38 G: 3; .375 INJECTION, SOLUTION INTRAVENOUS at 21:06

## 2025-03-23 RX ADMIN — POTASSIUM CHLORIDE 40 MEQ: 1.5 POWDER, FOR SOLUTION ORAL at 10:49

## 2025-03-23 RX ADMIN — ACETAMINOPHEN 650 MG: 325 TABLET, FILM COATED ORAL at 20:46

## 2025-03-23 RX ADMIN — PIPERACILLIN SODIUM AND TAZOBACTAM SODIUM 3.38 G: 3; .375 INJECTION, SOLUTION INTRAVENOUS at 15:39

## 2025-03-23 RX ADMIN — APIXABAN 5 MG: 5 TABLET, FILM COATED ORAL at 20:43

## 2025-03-23 RX ADMIN — Medication 3 MG: at 20:46

## 2025-03-23 RX ADMIN — PIPERACILLIN SODIUM AND TAZOBACTAM SODIUM 3.38 G: 3; .375 INJECTION, SOLUTION INTRAVENOUS at 10:25

## 2025-03-23 RX ADMIN — FAMOTIDINE 20 MG: 20 TABLET, FILM COATED ORAL at 20:43

## 2025-03-23 RX ADMIN — LISINOPRIL 20 MG: 20 TABLET ORAL at 10:49

## 2025-03-23 RX ADMIN — RIFABUTIN 300 MG: 150 CAPSULE ORAL at 10:47

## 2025-03-23 RX ADMIN — APIXABAN 5 MG: 5 TABLET, FILM COATED ORAL at 10:49

## 2025-03-23 RX ADMIN — TRAMADOL HYDROCHLORIDE 50 MG: 50 TABLET, COATED ORAL at 23:18

## 2025-03-23 RX ADMIN — MIRTAZAPINE 7.5 MG: 7.5 TABLET, FILM COATED ORAL at 20:43

## 2025-03-23 RX ADMIN — ISONIAZID 300 MG: 300 TABLET ORAL at 10:47

## 2025-03-23 RX ADMIN — FAMOTIDINE 20 MG: 20 TABLET, FILM COATED ORAL at 10:49

## 2025-03-23 RX ADMIN — TRAMADOL HYDROCHLORIDE 50 MG: 50 TABLET, COATED ORAL at 10:56

## 2025-03-23 RX ADMIN — PYRIDOXINE HCL TAB 50 MG 50 MG: 50 TAB at 10:49

## 2025-03-23 RX ADMIN — PIPERACILLIN SODIUM AND TAZOBACTAM SODIUM 3.38 G: 3; .375 INJECTION, SOLUTION INTRAVENOUS at 04:18

## 2025-03-23 RX ADMIN — METOPROLOL TARTRATE 50 MG: 50 TABLET, FILM COATED ORAL at 10:49

## 2025-03-23 ASSESSMENT — COGNITIVE AND FUNCTIONAL STATUS - GENERAL
MOVING FROM LYING ON BACK TO SITTING ON SIDE OF FLAT BED WITH BEDRAILS: A LITTLE
PERSONAL GROOMING: A LITTLE
HELP NEEDED FOR BATHING: A LITTLE
DRESSING REGULAR UPPER BODY CLOTHING: A LITTLE
TOILETING: A LITTLE
CLIMB 3 TO 5 STEPS WITH RAILING: A LOT
WALKING IN HOSPITAL ROOM: A LOT
TURNING FROM BACK TO SIDE WHILE IN FLAT BAD: A LITTLE
MOVING TO AND FROM BED TO CHAIR: A LOT
MOBILITY SCORE: 14
STANDING UP FROM CHAIR USING ARMS: A LOT
DAILY ACTIVITIY SCORE: 18
EATING MEALS: A LITTLE
DRESSING REGULAR LOWER BODY CLOTHING: A LITTLE

## 2025-03-23 ASSESSMENT — PAIN DESCRIPTION - DESCRIPTORS
DESCRIPTORS: ACHING;DISCOMFORT

## 2025-03-23 ASSESSMENT — PAIN SCALES - GENERAL
PAINLEVEL_OUTOF10: 0 - NO PAIN
PAINLEVEL_OUTOF10: 6
PAINLEVEL_OUTOF10: 6

## 2025-03-23 ASSESSMENT — PAIN - FUNCTIONAL ASSESSMENT
PAIN_FUNCTIONAL_ASSESSMENT: 0-10

## 2025-03-23 ASSESSMENT — PAIN DESCRIPTION - LOCATION
LOCATION: GENERALIZED
LOCATION: GENERALIZED

## 2025-03-23 ASSESSMENT — PAIN SCALES - PAIN ASSESSMENT IN ADVANCED DEMENTIA (PAINAD): TOTALSCORE: MEDICATION (SEE MAR)

## 2025-03-23 NOTE — PROGRESS NOTES
BRIEF  ID  CHART  REVIEW  NOTE      Chart reviewed  Vital signs stable  Urine culture pending  Will reassess 3/24, please call sooner prn    Nicko Solomon MD  [facility]  Office:  [phone]

## 2025-03-23 NOTE — PROGRESS NOTES
Mishel Scanlon is a 74 y.o. female on day 2 of admission presenting with Pyelonephritis.      Subjective   Patient denies fever or chills.  She is sleepy but easy to arouse.  Patient has been afebrile during the night.  She had an episode of agitation, started on Zyprexa.    Objective     Last Recorded Vitals  /76 (BP Location: Right arm, Patient Position: Lying)   Pulse 67   Temp 36.8 °C (98.2 °F) (Axillary)   Resp 18   Wt 69.4 kg (153 lb)   SpO2 98%   Intake/Output last 3 Shifts:    Intake/Output Summary (Last 24 hours) at 3/23/2025 0841  Last data filed at 3/22/2025 2135  Gross per 24 hour   Intake 200 ml   Output 1000 ml   Net -800 ml       Admission Weight  Weight: 69.4 kg (153 lb) (03/21/25 0846)    Daily Weight  03/21/25 : 69.4 kg (153 lb)    Image Results  ECG 12 lead  Sinus tachycardia  Otherwise normal ECG  No previous ECGs available  CT abdomen pelvis w IV contrast  Narrative: Interpreted By:  Tommy Cavanaugh,   STUDY:  CT ABDOMEN PELVIS W IV CONTRAST; 3/21/2025 11:29 am      INDICATION:  Signs/Symptoms:uti, back  pain, assess for pyelo      COMPARISON:  None.      ACCESSION NUMBER(S):  [id number]      ORDERING CLINICIAN:  JENNIFER HILLS      TECHNIQUE:  Initial contrast injection failed failed and unenhanced images were  obtained through the abdomen and pelvis. Following intravenous  administration of nonionic contrast, additional spiral axial images  were obtained from the dome of the diaphragm through the symphysis  pubis. Oral contrast was not administered.  Soft tissue and lung windows were evaluated.  Sagittal and Coronal reformatted images were also assessed.      All CT examinations are performed with one or more of the following  dose reduction techniques: Automated Exposure Control, adjustment of  mA and/or kV according to patient size, or use of iterative  reconstruction techniques.      FINDINGS:  Lung bases: Previously seen large left pleural effusion has improved,  however there  is new small right pleural effusion. Bibasilar  infiltrates/atelectatic changes are also noted. Liver: Normal in  size, with a couple of stable hypodensities anteriorly, too small to  characterize, most likely cysts. Gallbladder: Grossly unremarkable.  Spleen: Normal in size without focal lesions.  Pancreas: Suboptimally visualized, however no gross abnormality seen..  Adrenal glands: No focal lesions.  Kidneys and bladder: Both kidneys excrete contrast symmetrically,  without hydronephrosis or solid enhancing masses. Small bilateral  renal cysts are again present the largest in the lower pole of the  left kidney measuring 1.4 cm. There are no renal or ureteral stones.  The bladder is only partially distended and therefore suboptimally  evaluated. There is mild apparent bladder wall thickening and a  couple of possible small bladder diverticula.      Gastrointestinal tract and peritoneal cavity:  Copious amount of fecal material is noted in the colon, especially in  the rectum, however there is no bowel obstruction. Mild wall  thickening of the rectum and minimal associated stranding of the  adjacent mesenteric fat is again present, similar to prior, possibly  related to mild stercoral colitis. Few colonic diverticular present,  without acute diverticulitis. The appendix is not clearly visualized,  however there is no pericecal fat stranding to suggest acute  appendicitis. There is no free air or abnormal fluid collections in  the abdomen and pelvis.      The aorta is normal in caliber with atherosclerotic vascular  calcifications.. The SMA, SMV and portal vein are patent.  Pelvic reproductive organs: Unremarkable.      Osseous structures: There is moderate-to-marked levoscoliosis of the  thoracolumbar junction of the spine. Additionally there are  degenerative changes at multiple levels disc disease. Degenerative  changes also involve the hip joints bilaterally. No acute fracture or  subluxation is seen.       Impression: 1. Interval improvement of previously seen large left effusion and  interval development of small right pleural effusion with bibasilar  infiltrates/atelectatic changes.  2. Chronic constipation with possible mild stercoral colitis  involving the rectum. Also few colonic diverticula without acute  diverticulitis.  3. Apparent mild wall thickening of the urinary bladder, possibly due  to incomplete distention, with possible small bladder diverticula.  4. Additional chronic findings again present as above.      Signed by: Tommy Cavanaugh 3/21/2025 11:57 AM  Dictation workstation:   [id number]  XR chest 1 view  Narrative: Interpreted By:  Jennifer Gomez,   STUDY:  XR CHEST 1 VIEW;  3/21/2025 9:33 am      INDICATION:  Signs/Symptoms:sepsis,  weakness.      COMPARISON:  07/26/2023      ACCESSION NUMBER(S):  [id number]      ORDERING CLINICIAN:  JENNIFER HILLS      FINDINGS:  AP radiograph of the chest was provided.              CARDIOMEDIASTINAL SILHOUETTE:  Cardiomediastinal silhouette is stable in size and configuration.      LUNGS:  New small left pleural effusion with retrocardiac opacification. The  right lung is clear. No pneumothorax or pulmonary edema.      ABDOMEN:  No remarkable upper abdominal findings.      BONES:  No acute osseous changes.      Impression: New small left pleural effusion, superimposed infiltrate should be  considered given patient presentation of sepsis. Further evaluation  with CT chest could be obtained as per clinical need.      Signed by: Jennifer Gomez 3/21/2025 10:11 AM  Dictation workstation:   [id number]      Physical Exam    General:  cooperating during physical exam, loss of muscle mass  HEENT: Pupils are equal and reactive to light and commendation , oral mucosa moist, no JVD   Cardiovascular: PMI nondisplaced  Lungs: Diminished breath sound on left lung base  Abdomen: No hepatosplenomegaly appreciated, soft , not tender, positive bowel sounds, positive bowel  movement.  Neuro: Alert and oriented x2, strength in upper and lower extremities , sensation intact.  Musculoskeletal: Trace edema both lower extremity vascular: Pulses are intact in upper and lower extremities  Skin: No petechiae, ecchymosis or other stigmata for dermatology disease.     Assessment/Plan        Systemic inflammatory response syndrome  Probably secondary to UTI  Patient UA was normal but was started on antibiotics 1 day prior to admission  Blood culture, urine culture no growth so far  CT abdomen pelvis no evidence of hydronephrosis, thickening of urinary bladder  Evidence of left pleural effusion  Continue with Cornel Solomon on the case     Left pleural effusion  Dr Tucker on the case.  Discussed with pulmonary, no plan for thoracocentesis or further workup.  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    She was started on medication.  Patient is on isoniazid and rifabutin through April 4 according to her son  Discussed with ID on the case.  No need for isolation     Hypertension  Fairly controlled   resume her home medication  Patient is on lisinopril and metoprolol  Ordered labetalol for systolic blood pressure more than 180     History of breast cancer  S/p lumpectomy and radiation  Monitor close    Hypokalemia  Replace it  Check magnesium level     Atrial fibrillation  Continue with metoprolol and Eliquis      Deconditioning  Precaution  Ambulate with assistant     Discussed in detail with her son Cristofer GREENE present in the room today  Patient is DNR CCA/DNI okay with ICU admission  Check CBC and RFP in AM.  Replace potassium  Continue with Cornel Goyal MD

## 2025-03-23 NOTE — CARE PLAN
The patient's goals for the shift include improve uti symptoms    The clinical goals for the shift include maintain safety    Problem: Pain - Adult  Goal: Verbalizes/displays adequate comfort level or baseline comfort level  Outcome: Progressing     Problem: Safety - Adult  Goal: Free from fall injury  Outcome: Progressing     Problem: Discharge Planning  Goal: Discharge to home or other facility with appropriate resources  Outcome: Progressing     Problem: Chronic Conditions and Co-morbidities  Goal: Patient's chronic conditions and co-morbidity symptoms are monitored and maintained or improved  Outcome: Progressing     Problem: Nutrition  Goal: Nutrient intake appropriate for maintaining nutritional needs  Outcome: Progressing     Problem: Skin  Goal: Decreased wound size/increased tissue granulation at next dressing change  Outcome: Progressing  Flowsheets (Taken 3/23/2025 1259)  Decreased wound size/increased tissue granulation at next dressing change: Promote sleep for wound healing  Goal: Participates in plan/prevention/treatment measures  Outcome: Progressing  Flowsheets (Taken 3/23/2025 125)  Participates in plan/prevention/treatment measures:   Increase activity/out of bed for meals   Elevate heels  Goal: Prevent/manage excess moisture  Outcome: Progressing  Flowsheets (Taken 3/23/2025 1256)  Prevent/manage excess moisture:   Monitor for/manage infection if present   Cleanse incontinence/protect with barrier cream   Moisturize dry skin  Goal: Prevent/minimize sheer/friction injuries  Outcome: Progressing  Flowsheets (Taken 3/23/2025 125)  Prevent/minimize sheer/friction injuries:   HOB 30 degrees or less   Turn/reposition every 2 hours/use positioning/transfer devices   Increase activity/out of bed for meals  Goal: Promote/optimize nutrition  Outcome: Progressing  Flowsheets (Taken 3/23/2025 125)  Promote/optimize nutrition:   Offer water/supplements/favorite foods   Consume > 50%  meals/supplements  Goal: Promote skin healing  Outcome: Progressing  Flowsheets (Taken 3/23/2025 0804)  Promote skin healing:   Rotate device position/do not position patient on device   Protective dressings over bony prominences     Problem: Pain  Goal: Takes deep breaths with improved pain control throughout the shift  Outcome: Progressing  Goal: Turns in bed with improved pain control throughout the shift  Outcome: Progressing  Goal: Walks with improved pain control throughout the shift  Outcome: Progressing  Goal: Performs ADL's with improved pain control throughout shift  Outcome: Progressing  Goal: Participates in PT with improved pain control throughout the shift  Outcome: Progressing  Goal: Free from opioid side effects throughout the shift  Outcome: Progressing  Goal: Free from acute confusion related to pain meds throughout the shift  Outcome: Progressing

## 2025-03-24 LAB
ALBUMIN SERPL BCP-MCNC: 2.9 G/DL (ref 3.4–5)
ANION GAP SERPL CALCULATED.3IONS-SCNC: 9 MMOL/L (ref 10–20)
BASOPHILS # BLD MANUAL: 0.02 X10*3/UL (ref 0–0.1)
BASOPHILS NFR BLD MANUAL: 1 %
BUN SERPL-MCNC: 13 MG/DL (ref 6–23)
CALCIUM SERPL-MCNC: 8.1 MG/DL (ref 8.6–10.3)
CHLORIDE SERPL-SCNC: 109 MMOL/L (ref 98–107)
CO2 SERPL-SCNC: 27 MMOL/L (ref 21–32)
CREAT SERPL-MCNC: 0.64 MG/DL (ref 0.5–1.05)
EGFRCR SERPLBLD CKD-EPI 2021: >90 ML/MIN/1.73M*2
EOSINOPHIL # BLD MANUAL: 0.02 X10*3/UL (ref 0–0.4)
EOSINOPHIL NFR BLD MANUAL: 1 %
ERYTHROCYTE [DISTWIDTH] IN BLOOD BY AUTOMATED COUNT: 12.1 % (ref 11.5–14.5)
GLUCOSE BLD MANUAL STRIP-MCNC: 102 MG/DL (ref 74–99)
GLUCOSE BLD MANUAL STRIP-MCNC: 97 MG/DL (ref 74–99)
GLUCOSE SERPL-MCNC: 75 MG/DL (ref 74–99)
HCT VFR BLD AUTO: 32.8 % (ref 36–46)
HGB BLD-MCNC: 10.7 G/DL (ref 12–16)
IMM GRANULOCYTES # BLD AUTO: 0 X10*3/UL (ref 0–0.5)
IMM GRANULOCYTES NFR BLD AUTO: 0 % (ref 0–0.9)
LYMPHOCYTES # BLD MANUAL: 0.77 X10*3/UL (ref 0.8–3)
LYMPHOCYTES NFR BLD MANUAL: 43 %
MCH RBC QN AUTO: 31.5 PG (ref 26–34)
MCHC RBC AUTO-ENTMCNC: 32.6 G/DL (ref 32–36)
MCV RBC AUTO: 97 FL (ref 80–100)
MONOCYTES # BLD MANUAL: 0.36 X10*3/UL (ref 0.05–0.8)
MONOCYTES NFR BLD MANUAL: 20 %
NEUTS SEG # BLD MANUAL: 0.63 X10*3/UL (ref 1.6–5)
NEUTS SEG NFR BLD MANUAL: 35 %
NRBC BLD-RTO: 0 /100 WBCS (ref 0–0)
PHOSPHATE SERPL-MCNC: 3.1 MG/DL (ref 2.5–4.9)
PLATELET # BLD AUTO: 184 X10*3/UL (ref 150–450)
POLYCHROMASIA BLD QL SMEAR: ABNORMAL
POTASSIUM SERPL-SCNC: 2.9 MMOL/L (ref 3.5–5.3)
RBC # BLD AUTO: 3.4 X10*6/UL (ref 4–5.2)
RBC MORPH BLD: ABNORMAL
SODIUM SERPL-SCNC: 142 MMOL/L (ref 136–145)
TOTAL CELLS COUNTED BLD: 100
WBC # BLD AUTO: 1.8 X10*3/UL (ref 4.4–11.3)

## 2025-03-24 PROCEDURE — 2500000004 HC RX 250 GENERAL PHARMACY W/ HCPCS (ALT 636 FOR OP/ED): Performed by: INTERNAL MEDICINE

## 2025-03-24 PROCEDURE — 97162 PT EVAL MOD COMPLEX 30 MIN: CPT | Mod: GP

## 2025-03-24 PROCEDURE — 1210000001 HC SEMI-PRIVATE ROOM DAILY

## 2025-03-24 PROCEDURE — 82947 ASSAY GLUCOSE BLOOD QUANT: CPT

## 2025-03-24 PROCEDURE — 36415 COLL VENOUS BLD VENIPUNCTURE: CPT | Performed by: INTERNAL MEDICINE

## 2025-03-24 PROCEDURE — 2500000005 HC RX 250 GENERAL PHARMACY W/O HCPCS: Performed by: INTERNAL MEDICINE

## 2025-03-24 PROCEDURE — 97165 OT EVAL LOW COMPLEX 30 MIN: CPT | Mod: GO

## 2025-03-24 PROCEDURE — 2500000001 HC RX 250 WO HCPCS SELF ADMINISTERED DRUGS (ALT 637 FOR MEDICARE OP): Performed by: INTERNAL MEDICINE

## 2025-03-24 PROCEDURE — 2500000002 HC RX 250 W HCPCS SELF ADMINISTERED DRUGS (ALT 637 FOR MEDICARE OP, ALT 636 FOR OP/ED): Performed by: INTERNAL MEDICINE

## 2025-03-24 PROCEDURE — 99232 SBSQ HOSP IP/OBS MODERATE 35: CPT | Performed by: INTERNAL MEDICINE

## 2025-03-24 PROCEDURE — 80069 RENAL FUNCTION PANEL: CPT | Performed by: INTERNAL MEDICINE

## 2025-03-24 PROCEDURE — 85027 COMPLETE CBC AUTOMATED: CPT | Performed by: INTERNAL MEDICINE

## 2025-03-24 PROCEDURE — 85007 BL SMEAR W/DIFF WBC COUNT: CPT | Performed by: INTERNAL MEDICINE

## 2025-03-24 RX ORDER — POTASSIUM CHLORIDE 14.9 MG/ML
20 INJECTION INTRAVENOUS ONCE
Status: COMPLETED | OUTPATIENT
Start: 2025-03-24 | End: 2025-03-24

## 2025-03-24 RX ORDER — POTASSIUM CHLORIDE 20 MEQ/1
20 TABLET, EXTENDED RELEASE ORAL
Status: COMPLETED | OUTPATIENT
Start: 2025-03-24 | End: 2025-03-24

## 2025-03-24 RX ORDER — OLANZAPINE 10 MG/2ML
2.5 INJECTION, POWDER, FOR SOLUTION INTRAMUSCULAR ONCE AS NEEDED
Status: DISCONTINUED | OUTPATIENT
Start: 2025-03-24 | End: 2025-03-25 | Stop reason: HOSPADM

## 2025-03-24 RX ORDER — OLANZAPINE 5 MG/1
5 TABLET, ORALLY DISINTEGRATING ORAL NIGHTLY
Status: DISCONTINUED | OUTPATIENT
Start: 2025-03-24 | End: 2025-03-25 | Stop reason: HOSPADM

## 2025-03-24 RX ADMIN — APIXABAN 5 MG: 5 TABLET, FILM COATED ORAL at 09:05

## 2025-03-24 RX ADMIN — APIXABAN 5 MG: 5 TABLET, FILM COATED ORAL at 20:31

## 2025-03-24 RX ADMIN — METOPROLOL TARTRATE 50 MG: 50 TABLET, FILM COATED ORAL at 09:05

## 2025-03-24 RX ADMIN — FAMOTIDINE 20 MG: 20 TABLET, FILM COATED ORAL at 20:31

## 2025-03-24 RX ADMIN — FAMOTIDINE 20 MG: 20 TABLET, FILM COATED ORAL at 09:05

## 2025-03-24 RX ADMIN — ISONIAZID 300 MG: 300 TABLET ORAL at 09:05

## 2025-03-24 RX ADMIN — Medication 3 MG: at 20:31

## 2025-03-24 RX ADMIN — TRAMADOL HYDROCHLORIDE 50 MG: 50 TABLET, COATED ORAL at 20:38

## 2025-03-24 RX ADMIN — RIFABUTIN 300 MG: 150 CAPSULE ORAL at 09:05

## 2025-03-24 RX ADMIN — METOPROLOL TARTRATE 50 MG: 50 TABLET, FILM COATED ORAL at 20:31

## 2025-03-24 RX ADMIN — PIPERACILLIN SODIUM AND TAZOBACTAM SODIUM 3.38 G: 3; .375 INJECTION, SOLUTION INTRAVENOUS at 04:40

## 2025-03-24 RX ADMIN — PIPERACILLIN SODIUM AND TAZOBACTAM SODIUM 3.38 G: 3; .375 INJECTION, SOLUTION INTRAVENOUS at 09:05

## 2025-03-24 RX ADMIN — OLANZAPINE 5 MG: 5 TABLET, ORALLY DISINTEGRATING ORAL at 20:30

## 2025-03-24 RX ADMIN — MIRTAZAPINE 7.5 MG: 7.5 TABLET, FILM COATED ORAL at 20:31

## 2025-03-24 RX ADMIN — POTASSIUM CHLORIDE 20 MEQ: 1500 TABLET, EXTENDED RELEASE ORAL at 10:16

## 2025-03-24 RX ADMIN — POTASSIUM CHLORIDE 20 MEQ: 14.9 INJECTION, SOLUTION INTRAVENOUS at 10:15

## 2025-03-24 RX ADMIN — POTASSIUM CHLORIDE 20 MEQ: 1500 TABLET, EXTENDED RELEASE ORAL at 12:48

## 2025-03-24 RX ADMIN — LISINOPRIL 20 MG: 20 TABLET ORAL at 09:05

## 2025-03-24 RX ADMIN — POTASSIUM CHLORIDE 20 MEQ: 1500 TABLET, EXTENDED RELEASE ORAL at 13:57

## 2025-03-24 RX ADMIN — PYRIDOXINE HCL TAB 50 MG 50 MG: 50 TAB at 09:06

## 2025-03-24 ASSESSMENT — COGNITIVE AND FUNCTIONAL STATUS - GENERAL
WALKING IN HOSPITAL ROOM: A LOT
TOILETING: A LOT
PERSONAL GROOMING: A LOT
WALKING IN HOSPITAL ROOM: A LOT
HELP NEEDED FOR BATHING: A LOT
DRESSING REGULAR UPPER BODY CLOTHING: A LITTLE
EATING MEALS: A LITTLE
DRESSING REGULAR LOWER BODY CLOTHING: A LOT
CLIMB 3 TO 5 STEPS WITH RAILING: TOTAL
MOVING FROM LYING ON BACK TO SITTING ON SIDE OF FLAT BED WITH BEDRAILS: A LITTLE
PERSONAL GROOMING: A LITTLE
DRESSING REGULAR LOWER BODY CLOTHING: A LOT
EATING MEALS: A LITTLE
MOBILITY SCORE: 14
DAILY ACTIVITIY SCORE: 15
STANDING UP FROM CHAIR USING ARMS: A LOT
TURNING FROM BACK TO SIDE WHILE IN FLAT BAD: A LITTLE
STANDING UP FROM CHAIR USING ARMS: A LOT
HELP NEEDED FOR BATHING: A LOT
DAILY ACTIVITIY SCORE: 13
MOVING TO AND FROM BED TO CHAIR: A LOT
DRESSING REGULAR UPPER BODY CLOTHING: A LOT
TOILETING: A LOT
MOVING TO AND FROM BED TO CHAIR: A LOT
CLIMB 3 TO 5 STEPS WITH RAILING: TOTAL

## 2025-03-24 ASSESSMENT — PAIN SCALES - GENERAL
PAINLEVEL_OUTOF10: 9
PAINLEVEL_OUTOF10: 0 - NO PAIN
PAINLEVEL_OUTOF10: 5 - MODERATE PAIN
PAINLEVEL_OUTOF10: 5 - MODERATE PAIN
PAINLEVEL_OUTOF10: 0 - NO PAIN

## 2025-03-24 ASSESSMENT — ACTIVITIES OF DAILY LIVING (ADL)
ADL_ASSISTANCE: INDEPENDENT
ADL_ASSISTANCE: INDEPENDENT
BATHING_ASSISTANCE: MINIMAL
LACK_OF_TRANSPORTATION: NO

## 2025-03-24 ASSESSMENT — PAIN DESCRIPTION - LOCATION: LOCATION: BACK

## 2025-03-24 ASSESSMENT — PAIN - FUNCTIONAL ASSESSMENT
PAIN_FUNCTIONAL_ASSESSMENT: 0-10

## 2025-03-24 ASSESSMENT — PAIN DESCRIPTION - ORIENTATION: ORIENTATION: LOWER

## 2025-03-24 ASSESSMENT — PAIN SCALES - WONG BAKER: WONGBAKER_NUMERICALRESPONSE: HURTS LITTLE MORE

## 2025-03-24 NOTE — CARE PLAN
The patient's goals for the shift include improve uti symptoms    The clinical goals for the shift include maintain safety      Problem: Pain - Adult  Goal: Verbalizes/displays adequate comfort level or baseline comfort level  Outcome: Progressing     Problem: Safety - Adult  Goal: Free from fall injury  Outcome: Progressing     Problem: Discharge Planning  Goal: Discharge to home or other facility with appropriate resources  Outcome: Progressing     Problem: Chronic Conditions and Co-morbidities  Goal: Patient's chronic conditions and co-morbidity symptoms are monitored and maintained or improved  Outcome: Progressing     Problem: Nutrition  Goal: Nutrient intake appropriate for maintaining nutritional needs  Outcome: Progressing     Problem: Skin  Goal: Decreased wound size/increased tissue granulation at next dressing change  Outcome: Progressing  Flowsheets (Taken 3/24/2025 1551)  Decreased wound size/increased tissue granulation at next dressing change: Protective dressings over bony prominences  Goal: Participates in plan/prevention/treatment measures  Outcome: Progressing  Flowsheets (Taken 3/24/2025 1551)  Participates in plan/prevention/treatment measures:   Increase activity/out of bed for meals   Elevate heels   Discuss with provider PT/OT consult  Goal: Prevent/manage excess moisture  Outcome: Progressing  Flowsheets (Taken 3/24/2025 1551)  Prevent/manage excess moisture:   Cleanse incontinence/protect with barrier cream   Moisturize dry skin   Monitor for/manage infection if present  Goal: Prevent/minimize sheer/friction injuries  Outcome: Progressing  Flowsheets (Taken 3/24/2025 1551)  Prevent/minimize sheer/friction injuries:   HOB 30 degrees or less   Increase activity/out of bed for meals   Use pull sheet  Goal: Promote/optimize nutrition  Outcome: Progressing  Flowsheets (Taken 3/24/2025 1551)  Promote/optimize nutrition:   Offer water/supplements/favorite foods   Monitor/record intake including  meals  Goal: Promote skin healing  Outcome: Progressing  Flowsheets (Taken 3/24/2025 8971)  Promote skin healing:   Protective dressings over bony prominences   Assess skin/pad under line(s)/device(s)     Problem: Pain  Goal: Takes deep breaths with improved pain control throughout the shift  Outcome: Progressing  Goal: Turns in bed with improved pain control throughout the shift  Outcome: Progressing  Goal: Walks with improved pain control throughout the shift  Outcome: Progressing  Goal: Performs ADL's with improved pain control throughout shift  Outcome: Progressing  Goal: Participates in PT with improved pain control throughout the shift  Outcome: Progressing  Goal: Free from opioid side effects throughout the shift  Outcome: Progressing  Goal: Free from acute confusion related to pain meds throughout the shift  Outcome: Progressing

## 2025-03-24 NOTE — ASSESSMENT & PLAN NOTE
Findings are rather unimpressive.  She will be seen by infectious disease today and perhaps we will stop the antibiotics.      Dimension debilitation-both PT and OT are recommending moderate intensity rehab.  Case management will have to talk to family about this      Pleural effusion-has been evaluated.  Nothing to do.      TB.-Continue isoniazid and rifampin.  Not clear when these are supposed to end.

## 2025-03-24 NOTE — PROGRESS NOTES
Mishel Scanlon is a 74 y.o. female on day 3 of admission presenting with Pyelonephritis.      Subjective   History of TB and left pleural effusion.  Admitted 2 days ago with fevers chills.  Being treated for pyelonephritis.  Is now on isoniazid, rifampin, and Zosyn.  Cultures are coming back negative.  She feels fine.       Objective alert pleasant and cooperative.  Poor historian  Heart irregular  Lungs clear to auscultation  Abdomen soft nontender nondistended  Extremities no edema.    Last Recorded Vitals  /77 (BP Location: Right arm, Patient Position: Lying)   Pulse 72   Temp 36.4 °C (97.5 °F) (Oral)   Resp 18   Wt 69.4 kg (153 lb)   SpO2 96%   Intake/Output last 3 Shifts:    Intake/Output Summary (Last 24 hours) at 3/24/2025 0953  Last data filed at 3/24/2025 0938  Gross per 24 hour   Intake 420 ml   Output --   Net 420 ml       Admission Weight  Weight: 69.4 kg (153 lb) (03/21/25 0846)    Daily Weight  03/21/25 : 69.4 kg (153 lb)    Image Results  ECG 12 lead  Sinus tachycardia  Otherwise normal ECG  No previous ECGs available  Confirmed by Abdiel Grove ([id number]) on 3/23/2025 4:07:16 PM        Assessment/Plan                  Assessment & Plan  Pyelonephritis  Findings are rather unimpressive.  She will be seen by infectious disease today and perhaps we will stop the antibiotics.      Dimension debilitation-both PT and OT are recommending moderate intensity rehab.  Case management will have to talk to family about this      Pleural effusion-has been evaluated.  Nothing to do.      TB.-Continue isoniazid and rifampin.  Not clear when these are supposed to end.                Nicko Duron MD

## 2025-03-24 NOTE — PROGRESS NOTES
03/24/25 1108   Discharge Planning   Living Arrangements Children  (Son)   Support Systems Children   Assistance Needed Has a raised toilet seat, shower chair. patient uses both a cane and walker. has an aide that comes twices a day for 2-4hrs at a time to help wit personal care   Type of Residence Private residence   Number of Stairs Within Residence 15   Do you have animals or pets at home? No   Who is requesting discharge planning? Provider   Home or Post Acute Services None   Expected Discharge Disposition Home   Does the patient need discharge transport arranged? No   Financial Resource Strain   How hard is it for you to pay for the very basics like food, housing, medical care, and heating? Not hard   Housing Stability   In the last 12 months, was there a time when you were not able to pay the mortgage or rent on time? N   In the past 12 months, how many times have you moved where you were living? 0   At any time in the past 12 months, were you homeless or living in a shelter (including now)? N   Transportation Needs   In the past 12 months, has lack of transportation kept you from medical appointments or from getting medications? no   In the past 12 months, has lack of transportation kept you from meetings, work, or from getting things needed for daily living? No     TCC met with patient and son bird at bedside. Introduced self and explained role. Patient lives home with with son. No reports of smoking or alcohol use.  PCP is Max[facility] Biswas [facility]. Patient has established  LW and POA. Son bird brown is listed on paperwork at this time. Plan is to return home. Declining SNF and HHC at this time     NO BARRIERS TO DISCHARGE FROM CARE TRANSITIONS

## 2025-03-24 NOTE — PROGRESS NOTES
Occupational Therapy    Evaluation    Patient Name: Mishel Scanlon  MRN: [id number]  Department: [facility]  Room: [address] [place] [address]  Today's Date: 3/24/2025  Time Calculation  Start Time: 0818  Stop Time: 0835  Time Calculation (min): 17 min    Assessment  IP OT Assessment  OT Assessment: OT orders received, chart reviewed, OT evaluation complete. Patient presents with significant deficits with ADLs, transfers and unable to participate in functional mobility secondary to decreased LE strength and overall motor planning. Paitent to benefit from skilled OT to address the above deficits and maximize return to PLOF.  Prognosis: Fair  Barriers to Discharge Home: Cognition needs  Cognition Needs: 24hr supervision for safety awareness needed, Insight of patient limited regarding functional ability/needs, Cognition-related high falls risk  Evaluation/Treatment Tolerance: Patient tolerated treatment well  Medical Staff Made Aware: Yes  End of Session Communication: Bedside nurse  End of Session Patient Position: Alarm on, Bed, 3 rail up (call light and phone within reach.)  Plan:  Treatment Interventions: ADL retraining, Functional transfer training, UE strengthening/ROM, Endurance training, Cognitive reorientation, Patient/family training, Equipment evaluation/education, Neuromuscular reeducation  OT Frequency: 4 times per week  OT Discharge Recommendations: Moderate intensity level of continued care  Equipment Recommended upon Discharge:  (TBD)  OT Recommended Transfer Status: Assist of 1  OT - OK to Discharge: Yes    Subjective   Current Problem:  1. Pyelonephritis        2. Sepsis without acute organ dysfunction, due to unspecified organism (Multi)          General:  General  Reason for Referral: impaired activities of daily living due to pyelonephritis  Referred By: Dottie Goyal MD  Past Medical History Relevant to Rehab: breast cancer s/p lumpectomy and radiation, HTN, TB infection, afib, MDD, chronic pain, spinal stenosis,  CVA, hyperparathyroidism  Family/Caregiver Present: No  Co-Treatment: PT  Co-Treatment Reason: to optimize safety and patient outcomes  Prior to Session Communication: Bedside nurse  Patient Position Received: Bed, 3 rail up, Alarm on  Preferred Learning Style: verbal, visual  General Comment: The patient is a 74 YOF. admitted to the hospital for increased confusion, currently being treated for pyelonephritis.  Precautions:  Hearing/Visual Limitations: glasses prn, mod [place]  Medical Precautions: Fall precautions     Date/Time Vitals Session Patient Position Pulse Resp SpO2 BP MAP (mmHg)    03/24/25 0818 --  --  72  --  --  --  --                Pain:  Pain Assessment  Pain Assessment: 0-10  0-10 (Numeric) Pain Score: 5 - Moderate pain  Pain Type: Chronic pain  Pain Location: Back  Pain Interventions: Repositioned  Response to Interventions: Content/relaxed    Objective   Cognition:  Overall Cognitive Status: Impaired  Orientation Level: Disoriented to place, Disoriented to time, Disoriented to situation  Following Commands: Follows one step commands with increased time  Cognition Comments: generalized confusion  Insight: Moderate  Impulsive: Mildly  Processing Speed: Delayed           Home Living:  Type of Home: House  Lives With: Adult children (Son)  Home Adaptive Equipment: Walker rolling or standard, Cane, Wheelchair-manual, Other (Comment) (rollator)  Home Layout: One level  Home Access: Stairs to enter without rails  Entrance Stairs-Rails: None  Entrance Stairs-Number of Steps: 2  Bathroom Shower/Tub: Walk-in shower  Bathroom Toilet: Standard  Bathroom Equipment: Grab bars in shower  Home Living Comments: single floor home   Prior Function:  Level of [place]: Independent with ADLs and functional transfers, Needs assistance with homemaking  Receives Help From: Family  ADL Assistance: Independent  Homemaking Assistance: Needs assistance (Completed by family)  Ambulatory Assistance: Independent (with rollator  versus cane)       ADL:  Eating Assistance: Stand by  Eating Deficit: Supervision/safety  Grooming Assistance: Stand by  Grooming Deficit: Supervision/safety  Bathing Assistance: Minimal  Bathing Deficit:  (anticipated)  UE Dressing Assistance: Minimal  UE Dressing Deficit:  (anticipated)  LE Dressing Assistance: Moderate  LE Dressing Deficit:  (pt able to don/doff socks while sitting in chair, requires A for standing balance during LB dressing (anticipate mod A for pants))  Toileting Assistance with Device: Moderate  Toileting Deficit:  (anticipated)  Activity Tolerance:  Endurance: Tolerates less than 10 min exercise with changes in vital signs  Rate of Perceived Exertion (RPE): 4/10  Bed Mobility/Transfers: Bed Mobility  Bed Mobility: Yes  Bed Mobility 1  Bed Mobility 1: Supine to sitting  Level of Assistance 1: Minimum assistance  Bed Mobility Comments 1: requires A for trunk elevation; pt able to transition LEs to EOB.  Bed Mobility 2  Bed Mobility  2: Sitting to supine  Level of Assistance 2: Close supervision  Bed Mobility Comments 2: Pt able to transition LEs and trunk back into bed with close SPV and cueing.    Transfers  Transfer: Yes  Transfer 1  Transfer From 1: Bed to, Chair with arms to  Transfer to 1: Chair with arms, Bed  Technique 1: Stand pivot, To right, To left  Transfer Level of Assistance 1: Moderate assistance  Trials/Comments 1: EOB<> chair with arms SPT with modA with increased time for motor planning noted.      Functional Mobility:  Functional Mobility  Functional Mobility Performed: No  Sitting Balance:  Static Sitting Balance  Static Sitting-Balance Support: Left upper extremity supported, Feet supported (static sitting EOB)  Static Sitting-Level of Assistance: Contact guard  Static Sitting-Comment/Number of Minutes: 3    Sensation:  Light Touch: Not tested  Strength:  Strength Comments: BUE strength 3+/5 grossly throughout       Coordination:  Movements are Fluid and Coordinated:  No  Upper Body Coordination: increased time noted for UE motor planning and sequencing.   Hand Function:  Hand Function  Gross Grasp: Functional  Coordination: Functional  Extremities: RUE   RUE : Within Functional Limits and LUE   LUE: Within Functional Limits    Outcome Measures: [facility] Daily Activity  Putting on and taking off regular lower body clothing: A lot  Bathing (including washing, rinsing, drying): A lot  Putting on and taking off regular upper body clothing: A little  Toileting, which includes using toilet, bedpan or urinal: A lot  Taking care of personal grooming such as brushing teeth: A little  Eating Meals: A little  Daily Activity - Total Score: 15      Education Documentation  ADL Training, taught by Lora Bajwa OT at 3/24/2025  9:16 AM.  Learner: Patient  Readiness: Eager  Method: Explanation  Response: Verbalizes Understanding  Comment: educated pt on safe body mechanics during donning/doffing socks to decreased risk of falls.    Education Comments  Patient educated on call light use, fall precautions, and O TPOC      Goals:   Encounter Problems       Encounter Problems (Active)       OT Goals       ADL       Start:  03/24/25    Expected End:  04/10/25       Patient will complete ADLs with CGA with AE PRN to maximize return to PLOF         Functional mobility       Start:  03/24/25    Expected End:  04/10/25       Patient will complete functional mobility with CGA with LRD to maximize return to PLOF         Functional transfers       Start:  03/24/25    Expected End:  04/10/25       Patient will complete functional transfers with CGA with LRD to maximize return to PLOF

## 2025-03-24 NOTE — PROGRESS NOTES
Physical Therapy    Physical Therapy Evaluation    Patient Name: Mishel Scanlon  MRN: [id number]  Department: [address]  Room: [facility]-  Today's Date: 3/24/2025   Time Calculation  Start Time: 0817  Stop Time: 0839  Time Calculation (min): 22 min    Assessment/Plan   PT Assessment  PT Assessment Results: Decreased strength, Decreased range of motion, Decreased endurance, Impaired balance, Decreased mobility, Decreased coordination, Decreased cognition, Impaired judgement, Decreased safety awareness, Impaired vision, Impaired hearing, Decreased skin integrity, Pain  Rehab Prognosis: Good  Barriers to Discharge Home: Caregiver assistance, Physical needs  Caregiver Assistance: Caregiver assistance needed per identified barriers - however, level of patient's required assistance exceeds assistance available at home  Physical Needs: 24hr mobility assistance needed, 24hr ADL assistance needed, High falls risk due to function or environment  Evaluation/Treatment Tolerance: Patient tolerated treatment well, Patient limited by fatigue  Medical Staff Made Aware: Yes  Strengths: Attitude of self  Barriers to Participation: Comorbidities  End of Session Communication: Bedside nurse  Assessment Comment: The patient is a 74 y.o. female admitted to the hospital for increased confusion. The patient currently requires min to modA for transfers and bedside ambulation with HHA. The patient would continue to benefit from skilled therapy services to address functional deficits.  End of Session Patient Position: Bed, 3 rail up, Alarm on (nsg request pt to remain in bed due to confusion and restlessness)  IP OR SWING BED PT PLAN  Inpatient or Swing Bed: Inpatient  PT Plan  Treatment/Interventions: Bed mobility, Transfer training, Gait training, Balance training, Neuromuscular re-education, Strengthening, Endurance training, Range of motion, Therapeutic exercise, Therapeutic activity, Home exercise program  PT Plan: Ongoing PT  PT Frequency:  3 times per week  PT Discharge Recommendations: Moderate intensity level of continued care  Equipment Recommended upon Discharge: Wheeled walker  PT Recommended Transfer Status: Assist x1, Assist x2  PT - OK to Discharge: Yes    Subjective   General Visit Information:  General  Reason for Referral: mobility impairment due to pyelonephritis  Referred By: Dottie Goyal MD  Past Medical History Relevant to Rehab: breast cancer s/p lumpectomy and radiation, HTN, TB infection, afib, MDD, chronic pain, spinal stenosis, CVA, hyperparathyroidism  Family/Caregiver Present: No  Co-Treatment: OT  Co-Treatment Reason: to optimize pt outcomes  Prior to Session Communication: Bedside nurse  Patient Position Received: Bed, 3 rail up, Alarm on  Preferred Learning Style: verbal, visual  General Comment: The patient is a 74 y.o. female admitted to the hospital for increased confusion.  Home Living:  Home Living  Type of Home: House  Lives With: Adult children (son)  Home Adaptive Equipment: Walker rolling or standard, Cane, Wheelchair-manual, Other (Comment) (rollator)  Home Layout: One level  Home Access: Stairs to enter without rails  Entrance Stairs-Rails: None  Entrance Stairs-Number of Steps: 2  Bathroom Shower/Tub: Walk-in shower  Bathroom Toilet: Standard  Bathroom Equipment: Grab bars in shower  Home Living Comments: single floor home  Prior Level of Function:  Prior Function Per Pt/Caregiver Report  Level of [place]: Independent with ADLs and functional transfers, Needs assistance with homemaking  Receives Help From: Family  ADL Assistance: Independent  Homemaking Assistance: Needs assistance (completed by family)  Ambulatory Assistance: Independent (with rollator vs cane)  Precautions:  Precautions  Hearing/Visual Limitations: glasses prn, mod [place]  Medical Precautions: Fall precautions      Date/Time Vitals Session Patient Position Pulse Resp SpO2 BP MAP (mmHg)    03/24/25 0818 --  --  72  --  --  --  --            Vital Signs Comment: HR: 72 bpm     Objective   Pain:  Pain Assessment  Pain Assessment: 0-10  0-10 (Numeric) Pain Score: 5 - Moderate pain  Pain Type: Chronic pain  Pain Location: Back  Pain Interventions: Repositioned, Ambulation/increased activity  Response to Interventions: Content/relaxed  Cognition:  Cognition  Overall Cognitive Status: Impaired  Orientation Level: Disoriented to place, Disoriented to time, Disoriented to situation  Following Commands: Follows one step commands with increased time  Cognition Comments: generalized confusion noted  Insight: Moderate  Impulsive: Mildly  Processing Speed: Delayed    General Assessments:  General Observation  General Observation: pleasantly confused     Activity Tolerance  Endurance: Tolerates less than 10 min exercise, no significant change in vital signs  Activity Tolerance Comments: limited due to general fatigue  Rate of Perceived Exertion (RPE): 4/10    Sensation  Sensation Comment: unable to formally assess due to impaired cognition    Strength  Strength Comments: BLE grossly 3-3+/5  Coordination  Coordination Comment: increased time and effort for mobility    Postural Control  Posture Comment: forward head; kyphotic posture; scoliosis of thoracic    Static Sitting Balance  Static Sitting-Balance Support: Feet supported  Static Sitting-Level of Assistance: Close supervision  Static Sitting-Comment/Number of Minutes: EOB > 5 min    Static Standing Balance  Static Standing-Balance Support: Bilateral upper extremity supported (HHA)  Static Standing-Level of Assistance: Moderate assistance  Static Standing-Comment/Number of Minutes: forward flexed posture  Dynamic Standing Balance  Dynamic Standing-Balance Support: Bilateral upper extremity supported (HHA)  Dynamic Standing-Level of Assistance: Moderate assistance  Dynamic Standing-Comments: modA for steps with shuffled gait  Functional Assessments:  Bed Mobility  Bed Mobility: Yes  Bed Mobility 1  Bed  Mobility 1: Supine to sitting  Level of Assistance 1: Minimum assistance  Bed Mobility Comments 1: [place] for trunk elevation; HOB to 30 deg; use of bed rails  Bed Mobility 2  Bed Mobility  2: Sitting to supine  Level of Assistance 2: Close supervision  Bed Mobility Comments 2: intermittent VCs for safe return to supine    Transfers  Transfer: Yes  Transfer 1  Transfer From 1: Bed to, Stand to  Transfer to 1: Stand, Chair with arms  Technique 1: Sit to stand, Stand to sit  Transfer Level of Assistance 1: Hand held assistance, Moderate assistance  Trials/Comments 1: VCs for safe sequencing  Transfers 2  Transfer From 2: Chair with arms to, Stand to  Transfer to 2: Stand, Bed  Technique 2: Sit to stand, Stand to sit  Transfer Level of Assistance 2: Hand held assistance, Moderate assistance    Ambulation/Gait Training  Ambulation/Gait Training Performed: Yes  Ambulation/Gait Training 1  Surface 1: Level tile  Device 1: No device  Assistance 1: Hand held assistance, Moderate assistance  Quality of Gait 1: Narrow base of support, Shuffling gait  Comments/Distance (ft) 1: 3ftx2 with HHA and modA; shuffled steps with very forward flexed posture; unsteady  Extremity/Trunk Assessments:  RLE   RLE :  (grossly 3-3+/5)  LLE   LLE :  (grossly 3-3+/5)  Outcome Measures:  [facility] Basic Mobility  Turning from your back to your side while in a flat bed without using bedrails: None  Moving from lying on your back to sitting on the side of a flat bed without using bedrails: A little  Moving to and from bed to chair (including a wheelchair): A lot  Standing up from a chair using your arms (e.g. wheelchair or bedside chair): A lot  To walk in hospital room: A lot  Climbing 3-5 steps with railing: Total  Basic Mobility - Total Score: 14    Encounter Problems       Encounter Problems (Active)       PT Problem       Strength (Progressing)       Start:  03/24/25    Expected End:  04/24/25       The patient will demonstrate an overall strength  of 4/5 in BLE to assist with completion of functional mobility.           Functional Mobility (Progressing)       Start:  03/24/25    Expected End:  04/24/25       The patient will complete functional mobility (bed mobility, transfers, etc.) at a distant supervision level with LRAD by DC.           Ambulation (Progressing)       Start:  03/24/25    Expected End:  04/24/25       The patient will be able to ambulate at a distant supervision level for >30ftx1 with RW.          Balance (Progressing)       Start:  03/24/25    Expected End:  04/24/25       The patient will demonstrate good dynamic standing balance during activity with LRAD.             Pain - Adult              Education Documentation  Mobility Training, taught by Barbara Cowart PT at 3/24/2025  9:44 AM.  Learner: Patient  Readiness: Acceptance  Method: Explanation  Response: Needs Reinforcement  Comment: educated pt on PT POC    Education Comments  No comments found.             no

## 2025-03-24 NOTE — PROGRESS NOTES
"INFECTIOUS DISEASES PROGRESS NOTE    Consulted / following patient for:  Pulmonary tuberculosis  Rule out urinary tract infection    Subjective   Interval History:   No dysuria  States she is still having the \"shaking\" episodes which she believes are attributable to her anti-TB medication, which she would like to stop.  No other specific complaints.     Objective   PHYSICAL EXAMINATION  Vital signs:  Visit Vitals  /77 (BP Location: Right arm, Patient Position: Lying)   Pulse 72   Temp 36.4 °C (97.5 °F) (Oral)   Resp 18      General: Resting comfortably, not toxic  HEENT:  No scleral icterus or conjunctival suffusion, oral mucosa moist.  No shaking episodes identified  Nodes:  Negative  Lungs:  Clear to auscultation  Breasts:  Not examined  Heart:  S1, S2 normal, no pathologic murmur appreciated  Abdomen:  Soft, nontender. No palpable organs or masses  Back:  No spinal or CVA tenderness  Extremities:  No cords, phlebitis, cellulitis  Neurologic:  Alert.  Grossly non-focal.      Relevant Results  WBC:  1800    Results from last 72 hours   Lab Units 03/24/25  0553   CREATININE mg/dL 0.64   ANION GAP mmol/L 9*   EGFR mL/min/1.73m*2 >90     Results from last 72 hours   Lab Units 03/22/25  0536   AST U/L 30   ALT U/L 9   ALK PHOS U/L 103   BILIRUBIN TOTAL mg/dL 0.6     Microbiology:  Blood: Negative X2  Urine: No significant growth  Nasal MRSA PCR: Not detected    Imaging:  CXR images personally reviewed: Left pleural effusion, to my eye appears somewhat smaller than the most recent [facility] film  CT abdomen and pelvis: Improving left pleural effusion, mild stercoral colitis     ASSESSMENT:  Rule out urinary tract infection  No evidence for urinary tract infection     Pulmonary tuberculosis  Details as noted above.  There has been radiographic improvement and the patient is nearing completion of the intended course of therapy.  I had a lengthy conversation this morning with Dr. Dumas, who was aware of all of these " developments and emphasizes that it is critical that we continue the anti-TB regimen through 4/4/2025 to satisfy the treatment requirements under the direction of [place].  She will follow-up with the patient.        PLANS:  -   Stop Zosyn  -   Continue isoniazid, pyridoxine, rifabutin until 4/4/2025  -   TB follow-up per Dr. Dumas    Reviewed with Dr. Duron  Reviewed with son at bedside (power of )  WILL SIGN OFF.  PLEASE RE-CONSULT PRN.  THANK YOU.      Nicko Solomon MD  [facility]  Office:  [phone]

## 2025-03-25 VITALS
BODY MASS INDEX: 26.12 KG/M2 | DIASTOLIC BLOOD PRESSURE: 95 MMHG | OXYGEN SATURATION: 99 % | WEIGHT: 153 LBS | SYSTOLIC BLOOD PRESSURE: 172 MMHG | HEART RATE: 70 BPM | HEIGHT: 64 IN | RESPIRATION RATE: 18 BRPM | TEMPERATURE: 97.5 F

## 2025-03-25 PROBLEM — N12 PYELONEPHRITIS: Status: RESOLVED | Noted: 2025-03-21 | Resolved: 2025-03-25

## 2025-03-25 LAB
ALBUMIN SERPL BCP-MCNC: 3.2 G/DL (ref 3.4–5)
ANION GAP SERPL CALCULATED.3IONS-SCNC: 9 MMOL/L (ref 10–20)
BACTERIA BLD CULT: NORMAL
BACTERIA BLD CULT: NORMAL
BASOPHILS # BLD MANUAL: 0.02 X10*3/UL (ref 0–0.1)
BASOPHILS NFR BLD MANUAL: 1 %
BUN SERPL-MCNC: 11 MG/DL (ref 6–23)
CALCIUM SERPL-MCNC: 8.7 MG/DL (ref 8.6–10.3)
CHLORIDE SERPL-SCNC: 109 MMOL/L (ref 98–107)
CO2 SERPL-SCNC: 27 MMOL/L (ref 21–32)
CREAT SERPL-MCNC: 0.64 MG/DL (ref 0.5–1.05)
EGFRCR SERPLBLD CKD-EPI 2021: >90 ML/MIN/1.73M*2
EOSINOPHIL # BLD MANUAL: 0.02 X10*3/UL (ref 0–0.4)
EOSINOPHIL NFR BLD MANUAL: 1 %
ERYTHROCYTE [DISTWIDTH] IN BLOOD BY AUTOMATED COUNT: 12 % (ref 11.5–14.5)
GLUCOSE BLD MANUAL STRIP-MCNC: 120 MG/DL (ref 74–99)
GLUCOSE BLD MANUAL STRIP-MCNC: 121 MG/DL (ref 74–99)
GLUCOSE BLD MANUAL STRIP-MCNC: 80 MG/DL (ref 74–99)
GLUCOSE BLD MANUAL STRIP-MCNC: 80 MG/DL (ref 74–99)
GLUCOSE SERPL-MCNC: 75 MG/DL (ref 74–99)
HCT VFR BLD AUTO: 35.6 % (ref 36–46)
HGB BLD-MCNC: 11.5 G/DL (ref 12–16)
IMM GRANULOCYTES # BLD AUTO: 0 X10*3/UL (ref 0–0.5)
IMM GRANULOCYTES NFR BLD AUTO: 0 % (ref 0–0.9)
LYMPHOCYTES # BLD MANUAL: 0.64 X10*3/UL (ref 0.8–3)
LYMPHOCYTES NFR BLD MANUAL: 28 %
MCH RBC QN AUTO: 31.4 PG (ref 26–34)
MCHC RBC AUTO-ENTMCNC: 32.3 G/DL (ref 32–36)
MCV RBC AUTO: 97 FL (ref 80–100)
MONOCYTES # BLD MANUAL: 0.35 X10*3/UL (ref 0.05–0.8)
MONOCYTES NFR BLD MANUAL: 15 %
NEUTS SEG # BLD MANUAL: 1.27 X10*3/UL (ref 1.6–5)
NEUTS SEG NFR BLD MANUAL: 55 %
NRBC BLD-RTO: 0 /100 WBCS (ref 0–0)
PHOSPHATE SERPL-MCNC: 2.7 MG/DL (ref 2.5–4.9)
PLATELET # BLD AUTO: 185 X10*3/UL (ref 150–450)
POLYCHROMASIA BLD QL SMEAR: ABNORMAL
POTASSIUM SERPL-SCNC: 3.8 MMOL/L (ref 3.5–5.3)
RBC # BLD AUTO: 3.66 X10*6/UL (ref 4–5.2)
RBC MORPH BLD: ABNORMAL
SODIUM SERPL-SCNC: 141 MMOL/L (ref 136–145)
TOTAL CELLS COUNTED BLD: 100
WBC # BLD AUTO: 2.3 X10*3/UL (ref 4.4–11.3)

## 2025-03-25 PROCEDURE — 85027 COMPLETE CBC AUTOMATED: CPT | Performed by: INTERNAL MEDICINE

## 2025-03-25 PROCEDURE — 99239 HOSP IP/OBS DSCHRG MGMT >30: CPT | Performed by: INTERNAL MEDICINE

## 2025-03-25 PROCEDURE — 36415 COLL VENOUS BLD VENIPUNCTURE: CPT | Performed by: INTERNAL MEDICINE

## 2025-03-25 PROCEDURE — 97530 THERAPEUTIC ACTIVITIES: CPT | Mod: GP,CQ

## 2025-03-25 PROCEDURE — 97110 THERAPEUTIC EXERCISES: CPT | Mod: GP,CQ

## 2025-03-25 PROCEDURE — 97535 SELF CARE MNGMENT TRAINING: CPT | Mod: GO,CO

## 2025-03-25 PROCEDURE — 97530 THERAPEUTIC ACTIVITIES: CPT | Mod: GO,CO

## 2025-03-25 PROCEDURE — 2500000001 HC RX 250 WO HCPCS SELF ADMINISTERED DRUGS (ALT 637 FOR MEDICARE OP): Performed by: INTERNAL MEDICINE

## 2025-03-25 PROCEDURE — 85007 BL SMEAR W/DIFF WBC COUNT: CPT | Performed by: INTERNAL MEDICINE

## 2025-03-25 PROCEDURE — 80069 RENAL FUNCTION PANEL: CPT | Performed by: INTERNAL MEDICINE

## 2025-03-25 PROCEDURE — 82947 ASSAY GLUCOSE BLOOD QUANT: CPT

## 2025-03-25 RX ORDER — OLANZAPINE 5 MG/1
5 TABLET, ORALLY DISINTEGRATING ORAL NIGHTLY
Qty: 60 TABLET | Refills: 2 | Status: SHIPPED | OUTPATIENT
Start: 2025-03-25

## 2025-03-25 RX ADMIN — ISONIAZID 300 MG: 300 TABLET ORAL at 08:38

## 2025-03-25 RX ADMIN — FAMOTIDINE 20 MG: 20 TABLET, FILM COATED ORAL at 08:37

## 2025-03-25 RX ADMIN — RIFABUTIN 300 MG: 150 CAPSULE ORAL at 08:38

## 2025-03-25 RX ADMIN — APIXABAN 5 MG: 5 TABLET, FILM COATED ORAL at 08:37

## 2025-03-25 RX ADMIN — PYRIDOXINE HCL TAB 50 MG 50 MG: 50 TAB at 08:40

## 2025-03-25 RX ADMIN — METOPROLOL TARTRATE 50 MG: 50 TABLET, FILM COATED ORAL at 08:37

## 2025-03-25 RX ADMIN — LISINOPRIL 20 MG: 20 TABLET ORAL at 08:37

## 2025-03-25 ASSESSMENT — ACTIVITIES OF DAILY LIVING (ADL)
BATHING_LEVEL_OF_ASSISTANCE: DEPENDENT
HOME_MANAGEMENT_TIME_ENTRY: 15

## 2025-03-25 ASSESSMENT — COGNITIVE AND FUNCTIONAL STATUS - GENERAL
CLIMB 3 TO 5 STEPS WITH RAILING: A LOT
HELP NEEDED FOR BATHING: A LOT
TOILETING: TOTAL
MOVING FROM LYING ON BACK TO SITTING ON SIDE OF FLAT BED WITH BEDRAILS: A LITTLE
WALKING IN HOSPITAL ROOM: A LOT
DRESSING REGULAR UPPER BODY CLOTHING: A LITTLE
HELP NEEDED FOR BATHING: A LOT
STANDING UP FROM CHAIR USING ARMS: A LOT
EATING MEALS: A LITTLE
MOVING FROM LYING ON BACK TO SITTING ON SIDE OF FLAT BED WITH BEDRAILS: A LITTLE
TOILETING: A LOT
CLIMB 3 TO 5 STEPS WITH RAILING: TOTAL
DRESSING REGULAR LOWER BODY CLOTHING: TOTAL
DRESSING REGULAR LOWER BODY CLOTHING: A LOT
DAILY ACTIVITIY SCORE: 13
DAILY ACTIVITIY SCORE: 13
MOVING TO AND FROM BED TO CHAIR: A LOT
MOBILITY SCORE: 15
EATING MEALS: A LITTLE
TURNING FROM BACK TO SIDE WHILE IN FLAT BAD: A LITTLE
PERSONAL GROOMING: A LOT
MOVING TO AND FROM BED TO CHAIR: A LITTLE
STANDING UP FROM CHAIR USING ARMS: A LITTLE
WALKING IN HOSPITAL ROOM: A LOT
DRESSING REGULAR UPPER BODY CLOTHING: A LOT
MOBILITY SCORE: 14
PERSONAL GROOMING: A LITTLE
TURNING FROM BACK TO SIDE WHILE IN FLAT BAD: A LITTLE

## 2025-03-25 ASSESSMENT — PAIN - FUNCTIONAL ASSESSMENT
PAIN_FUNCTIONAL_ASSESSMENT: 0-10
PAIN_FUNCTIONAL_ASSESSMENT: FLACC (FACE, LEGS, ACTIVITY, CRY, CONSOLABILITY)

## 2025-03-25 ASSESSMENT — PAIN SCALES - GENERAL
PAINLEVEL_OUTOF10: 0 - NO PAIN
PAINLEVEL_OUTOF10: 0 - NO PAIN
PAINLEVEL_OUTOF10: 4

## 2025-03-25 NOTE — PROGRESS NOTES
03/25/25 1242   Discharge Planning   Expected Discharge Disposition Home     Patient and son declining SNF and C   Patient to return home with private pay caregivers     NO BARRIERS TO DISCHARGE FROM CARE TRANSITIONS

## 2025-03-25 NOTE — CARE PLAN
The patient's goals for the shift include improve uti symptoms    The clinical goals for the shift include safety

## 2025-03-25 NOTE — PROGRESS NOTES
Spiritual Care Visit  Spiritual Care Request    Reason for Visit:  Routine Visit: Introduction     Request Received From:       Focus of Care:  Visited With: Patient         Refer to :          Spiritual Care Assessment    Spiritual Assessment:                      Care Provided:  Intended Effects: Build relationship of care and support, Convey a calming presence, Demonstrate caring and concern, Promote sense of peace    Sense of Community and or Gnosticist Affiliation:  Protestant   Values/Beliefs  Spiritual Requests During Hospitalization: Mishel asked to be anointed today.     Addressed Needs/Concerns and/or [place] Through:     Sacramental Encounters  Sacrament of Sick-Anointing: Anointed    Outcome:        Advance Directives:         Spiritual Care Annotation    Annotation:  Mishel asked to be anointed today.  [place]

## 2025-03-25 NOTE — ASSESSMENT & PLAN NOTE
Finish course of antibiotics    She has dementia with behavioral issues.  Have started her on Zyprexa.  Family wants to take her home but there are private duty health aides      Pleural effusion-has been evaluated.  Nothing to do.      TB.-Continue isoniazid and rifampin.  She is to take these through April 4 and follow-up with her infectious disease physician at [facility]..  Of note that physician called me today and asked me to get a follow-up chest CT.  Also of note that this patient has already been seen by infectious disease and pulmonary and they did not think this pleural effusion was of any significance.      I placed a call to the son to discuss possibly doing a CT I also wanted to discuss discharge.  He has not answered my call as of yet..

## 2025-03-25 NOTE — DISCHARGE SUMMARY
Discharge Diagnosis  Pyelonephritis    Issues Requiring Follow-Up  Tuberculosis.  Follow-up with infectious disease doctor next week.    Discharge Meds     Medication List      START taking these medications     OLANZapine zydis 5 mg disintegrating tablet; Commonly known as: ZyPREXA;   Dissolve 1 tablet (5 mg) in the mouth once daily at bedtime. Take 1 at   bedtime every night and can take 1 or 2 in the daytime as needed for   agitated behavior.     CONTINUE taking these medications     acetaminophen 500 mg tablet; Commonly known as: Tylenol   Eliquis 5 mg tablet; Generic drug: apixaban   isoniazid 300 mg tablet; Commonly known as: Nydrazid   lisinopril 20 mg tablet; TAKE ONE TABLET BY MOUTH EVERY DAY IN THE   MORNING   metoprolol tartrate 50 mg tablet; Commonly known as: Lopressor   mirtazapine 15 mg tablet; Commonly known as: Remeron   rifabutin 150 mg capsule; Commonly known as: Mycobutin   traMADol 50 mg tablet; Commonly known as: Ultram   VITAMIN B-6 ORAL     STOP taking these medications     docusate sodium 100 mg capsule; Commonly known as: Colace   nitrofurantoin (macrocrystal-monohydrate) 100 mg capsule; Commonly known   as: Macrobid       Test Results Pending At Discharge  Pending Labs       Order Current Status    Blood Culture Preliminary result    Blood Culture Preliminary result            Hospital Course   Demented patient was hospitalized recently and started on treatment for tuberculosis.  Came in with general malaise and urinary symptoms.  Urine has significant white cells.  Initially treated for pyelonephritis.  Urine culture actually came back negative.  After 4 days of antibiotics they were discontinued by infectious disease physician.  I had a discussion with the family about the patient's dementia.  She clearly has dementia Alzheimer's type.  There are some agitated behavior and sundowning.  I have put her on Zyprexa at bedtime and discharging her with a prescription for Zyprexa.  We have  discussed multiple times with the family home-going needs.  They want to take her home and they have their own private duty aides and do not want us to arrange any sort of home health care.  I discussed things with the son today.  She is to follow-up with her infectious disease doctor next week.  She is to finish her tuberculosis medications.  I spent 35 minutes coordinating this discharge today.  Pertinent Physical Exam At Time of Discharge  Physical Exam    Outpatient Follow-Up  No future appointments.      Nicko Duron MD

## 2025-03-25 NOTE — PROGRESS NOTES
Physical Therapy    Physical Therapy Treatment    Patient Name: Mishel Scanlon  MRN: [id number]  Department: [address]  Room: [address] [place] [address]  Today's Date: 3/25/2025  Time Calculation  Start Time: 1019  Stop Time: 1042  Time Calculation (min): 23 min         Assessment/Plan   PT Assessment  Barriers to Discharge Home: Caregiver assistance, Physical needs  Caregiver Assistance: Caregiver assistance needed per identified barriers - however, level of patient's required assistance exceeds assistance available at home  End of Session Communication: Bedside nurse  Assessment Comment: Pt continues to present with mobility and strength deficits, pt would benefit from moderate intensity to increase strength and improve functional mobility.  End of Session Patient Position: Up in chair, Alarm on  PT Plan  Inpatient/Swing Bed or Outpatient: Inpatient  PT Plan  Treatment/Interventions: Bed mobility, Transfer training, Gait training, Balance training, Neuromuscular re-education, Strengthening, Endurance training, Range of motion, Therapeutic exercise, Therapeutic activity, Home exercise program  PT Plan: Ongoing PT  PT Frequency: 3 times per week  PT Discharge Recommendations: Moderate intensity level of continued care  Equipment Recommended upon Discharge: Wheeled walker  PT Recommended Transfer Status: Assist x1, Assist x2  PT - OK to Discharge: Yes      General Visit Information:   PT  Visit  PT Received On: 03/25/25  General  Prior to Session Communication: Bedside nurse  Patient Position Received: Bed, 3 rail up, Alarm on  General Comment: Cleared by nursing to be seen for therapy, pt agreeable with tx, supine in bed upon arrival.    Subjective   Precautions:  Precautions  Medical Precautions: Fall precautions    Objective   Pain:  Pain Assessment  Pain Assessment: 0-10  0-10 (Numeric) Pain Score: 0 - No pain    Cognition:  Cognition  Overall Cognitive Status: Impaired at baseline (Pt fixated on her missing dog. Pt stopping  "multiple times throughout tx stating, \"Corie come here\" and asking therapist multiple times if I have seen her black dog.)  Orientation Level: Disoriented to place, Disoriented to time, Disoriented to situation     Postural Control:  Static Sitting Balance  Static Sitting-Balance Support: Feet supported  Static Sitting-Level of Assistance: Close supervision  Static Sitting-Comment/Number of Minutes: Fair+ seated static balance.  Static Standing Balance  Static Standing-Balance Support: Bilateral upper extremity supported  Static Standing-Level of Assistance: Minimum assistance  Static Standing-Comment/Number of Minutes: Fair- static standing balance with HHA on LUE     Treatments:  Therapeutic Exercise  Therapeutic Exercise Performed: Yes  Therapeutic Exercise Activity 1: Bilateral ankle pumps x15  Therapeutic Exercise Activity 2: Bilateral hip flexion x15  Therapeutic Exercise Activity 3: Bilateral knee extension x15  Therapeutic Exercise Activity 4: Resisted hip abd/add 15    Bed Mobility  Bed Mobility: Yes  Bed Mobility 1  Bed Mobility 1: Supine to sitting  Level of Assistance 1: Minimum assistance  Bed Mobility Comments 1: Min assist for trunk during supine to sit with HOB elevated, min assist to scoot EOB with use of draw sheet.    Ambulation/Gait Training  Ambulation/Gait Training Performed: Yes  Ambulation/Gait Training 1  Surface 1: Level tile  Device 1: No device  Assistance 1: Hand held assistance, Minimum assistance  Quality of Gait 1: Narrow base of support, Shuffling gait  Comments/Distance (ft) 1: 5-6 steps (bed to chair) with HHA, min assist for balance and safety.    Transfers  Transfer: Yes  Transfer 1  Transfer From 1: Sit to  Transfer to 1: Stand  Technique 1: Sit to stand, Stand to sit  Transfer Level of Assistance 1: Minimum assistance  Trials/Comments 1: Min assist for trunk up during sit to stand, decreased eccentric control in sitting.    Outcome Measures:  [facility] Basic Mobility  Turning from " your back to your side while in a flat bed without using bedrails: A little  Moving from lying on your back to sitting on the side of a flat bed without using bedrails: A little  Moving to and from bed to chair (including a wheelchair): A little  Standing up from a chair using your arms (e.g. wheelchair or bedside chair): A little  To walk in hospital room: A lot  Climbing 3-5 steps with railing: Total  Basic Mobility - Total Score: 15    Education Documentation  Mobility Training, taught by Liane Kirk PTA at 3/25/2025 10:47 AM.  Learner: Patient  Readiness: Acceptance  Method: Explanation  Response: Needs Reinforcement    Education Comments  03/25/25 1048 Liane Kirk PTA  Patient educated on the importance of mobility and participation with therapy. Educated on safety and use of call light for assistance.         Encounter Problems       Encounter Problems (Active)       PT Problem       Strength (Progressing)       Start:  03/24/25    Expected End:  04/24/25       The patient will demonstrate an overall strength of 4/5 in BLE to assist with completion of functional mobility.           Functional Mobility (Progressing)       Start:  03/24/25    Expected End:  04/24/25       The patient will complete functional mobility (bed mobility, transfers, etc.) at a distant supervision level with LRAD by DC.           Ambulation (Progressing)       Start:  03/24/25    Expected End:  04/24/25       The patient will be able to ambulate at a distant supervision level for >30ftx1 with RW.          Balance (Progressing)       Start:  03/24/25    Expected End:  04/24/25       The patient will demonstrate good dynamic standing balance during activity with LRAD.             Pain - Adult

## 2025-03-25 NOTE — PROGRESS NOTES
Occupational Therapy    OT Treatment    Patient Name: Mishel Scanlon  MRN: [id number]  Department: [facility]  Room: [address] [place] [address]  Today's Date: 3/25/2025  Time Calculation  Start Time: 1046  Stop Time: 1113  Time Calculation (min): 27 min        Assessment:  OT Assessment: Tolerated session fairly, demonstrating progression towards POC with pt demonstrating signficant incontinence this date requiring increased time for pericare hygiene. Pt is pleasantly confused requiring repetitive cues for safety awareness + attention to task. Pt would benefit from continued skilled OT services to improve strength, balance, and functional tolerance to increase independence with ADL tasks  Barriers to Discharge Home: Cognition needs, Physical needs  End of Session Communication: Bedside nurse  End of Session Patient Position: Up in chair, Alarm on  OT Assessment Results: Decreased ADL status, Decreased upper extremity strength, Decreased safe judgment during ADL, Decreased cognition, Decreased endurance, Decreased functional mobility  Plan:  Treatment Interventions: ADL retraining, Functional transfer training, UE strengthening/ROM, Endurance training, Cognitive reorientation, Patient/family training, Equipment evaluation/education, Neuromuscular reeducation  OT Frequency: 4 times per week  OT Discharge Recommendations: Moderate intensity level of continued care  Equipment Recommended upon Discharge:  (TBD)  OT Recommended Transfer Status: Assist of 1  OT - OK to Discharge: Yes  Treatment Interventions: ADL retraining, Functional transfer training, UE strengthening/ROM, Endurance training, Cognitive reorientation, Patient/family training, Equipment evaluation/education, Neuromuscular reeducation    Subjective   Previous Visit Info:  OT Last Visit  OT Received On: 03/25/25  General:  General  Reason for Referral: impaired activities of daily living due to pyelonephritis  Prior to Session Communication: Bedside nurse  Patient Position  Received: Up in chair, Alarm on  General Comment: Cleared for therapy per RN. Pt seated in chair upon arrival and agreeable to tx, pleasantly confused  Precautions:  Hearing/Visual Limitations: glasses prn, mod [place]  Medical Precautions: Fall precautions    Pain:  Pain Assessment  Pain Assessment: FLACC (Face, Legs, Activity, Cry, Consolability)  0-10 (Numeric) Pain Score: 4  Pain Type: Chronic pain  Pain Location: Back  Pain Interventions: Repositioned, Ambulation/increased activity  Response to Interventions: Resting quietly    Objective    Cognition:  Cognition  Overall Cognitive Status: Impaired at baseline  Orientation Level: Disoriented to situation, Disoriented to time, Disoriented to place  Following Commands: Follows one step commands with repetition  Cognition Comments: pleasantly confused but cooperative, talking about how she lost her dog yesterday with re-orientation provided  Safety/Judgement: Exceptions to WFL  Insight: Moderate  Impulsive: Mildly  Processing Speed: Delayed  Coordination:  Movements are Fluid and Coordinated: No  Upper Body Coordination: increased time noted for UE motor planning and sequencing.  Activities of Daily Living:    Grooming  Grooming Level of Assistance: Setup, Close supervision  Grooming Where Assessed: Chair  Grooming Comments: face washing and oral hygiene tasks with cues for task initiation/termination         LE Bathing  LE Bathing Level of Assistance: Dependent  LE Bathing Comments: dependent assist to cleanse BLE following significant urine incontinence    UE Dressing  UE Dressing Level of Assistance: Minimum assistance  UE Dressing Where Assessed: Chair  UE Dressing Comments: assist to don/doff gown x2 with repetitive cues to thread BUE in sleeves    LE Dressing  LE Dressing: Yes  Pants Level of Assistance: Dependent  LE Dressing Comments: dependent assist to doff saturated underwear following urine + bowel incontinence    Toileting  Toileting Level of Assistance:  Dependent  Toileting Comments: demonstrating bowel + urine incontinence upon arrival, requiring dependent assist for  pericare hygiene. Once cleaned up, pt demonstrating continued incontinence once again requiring dependent assist for pericare    Bed Mobility/Transfers:    Transfers  Transfer: Yes  Transfer 1  Technique 1: Sit to stand, Stand to sit  Transfer Device 1: Walker  Transfer Level of Assistance 1: Minimum assistance, Moderate verbal cues  Trials/Comments 1: sit<>stand x2 with assist for balance with cues for proper hand placement, decreased eccentric lowering to chair. Pt tolerated standing ~12 sec/each trial with repetitive cues for postural alignment    Standing Balance:  Static Standing Balance  Static Standing-Level of Assistance: Minimum assistance  Static Standing-Comment/Number of Minutes: fair- balance with intermittent retropulsion    Outcome Measures:[facility] Daily Activity  Putting on and taking off regular lower body clothing: Total  Bathing (including washing, rinsing, drying): A lot  Putting on and taking off regular upper body clothing: A little  Toileting, which includes using toilet, bedpan or urinal: Total  Taking care of personal grooming such as brushing teeth: A little  Eating Meals: A little  Daily Activity - Total Score: 13    Education Documentation  ADL Training, taught by HARRIS Morris at 3/25/2025 11:30 AM.  Learner: Patient  Readiness: Acceptance  Method: Explanation  Response: Verbalizes Understanding, Needs Reinforcement  Comment: educated on safety awareness + use of call light    Education Comments  No comments found.      Problem: OT Goals  Goal: ADL  Description: Patient will complete ADLs with CGA with AE PRN to maximize return to PLOF  Outcome: Progressing  Goal: Functional mobility  Description: Patient will complete functional mobility with CGA with LRD to maximize return to PLOF  Outcome: Progressing  Goal: Functional transfers  Description: Patient will  complete functional transfers with CGA with LRD to maximize return to PLOF  Outcome: Progressing

## 2025-03-25 NOTE — PROGRESS NOTES
Mishel Scanlon is a 74 y.o. female on day 4 of admission presenting with Pyelonephritis.      Subjective   Demented woman who is currently being treated for tuberculosis.  Admitted here a few days ago and treated for UTI.  Yesterday I discussed dementia with the family and started the patient on nighttime Zyprexa.  I believe things were relatively calm overnight.  Today she is trying to get out of bed and is a little difficult to redirect but nobody has given her any of the as needed Zyprexa and she has not required restraints or sitter.       Objective   Awake able to speak and communicate somewhat appropriately but also disoriented and a bit agitated.  Last Recorded Vitals  BP (!) 172/95 (BP Location: Right arm, Patient Position: Lying) Comment: NURSE  NOTIFIED  Pulse 70   Temp 36.4 °C (97.5 °F) (Oral)   Resp 18   Wt 69.4 kg (153 lb)   SpO2 99%   Intake/Output last 3 Shifts:    Intake/Output Summary (Last 24 hours) at 3/25/2025 1351  Last data filed at 3/25/2025 0925  Gross per 24 hour   Intake 470 ml   Output 250 ml   Net 220 ml       Admission Weight  Weight: 69.4 kg (153 lb) (03/21/25 0846)    Daily Weight  03/21/25 : 69.4 kg (153 lb)    Image Results  ECG 12 lead  Sinus tachycardia  Otherwise normal ECG  No previous ECGs available  Confirmed by Abdiel Grove ([id number]) on 3/23/2025 4:07:16 PM        Assessment/Plan                  Assessment & Plan  Pyelonephritis  Finish course of antibiotics    She has dementia with behavioral issues.  Have started her on Zyprexa.  Family wants to take her home but there are private duty health aides      Pleural effusion-has been evaluated.  Nothing to do.      TB.-Continue isoniazid and rifampin.  She is to take these through April 4 and follow-up with her infectious disease physician at [facility]..  Of note that physician called me today and asked me to get a follow-up chest CT.  Also of note that this patient has already been seen by infectious disease and  pulmonary and they did not think this pleural effusion was of any significance.      I placed a call to the son to discuss possibly doing a CT I also wanted to discuss discharge.  He has not answered my call as of yet..                Nicko Duron MD

## 2025-03-25 NOTE — CARE PLAN
Problem: Pain - Adult  Goal: Verbalizes/displays adequate comfort level or baseline comfort level  Outcome: Progressing   The patient's goals for the shift include improve uti symptoms    The clinical goals for the shift include safety

## 2025-04-01 ENCOUNTER — TELEPHONE (OUTPATIENT)
Dept: INPATIENT UNIT | Facility: HOSPITAL | Age: 75
End: 2025-04-01
Payer: MEDICARE

## 2025-04-12 NOTE — DOCUMENTATION CLARIFICATION NOTE
"    PATIENT:               LOUISA SCANLON  ACCT #:                  [id number]  MRN:                       [id number]  :                       1950  ADMIT DATE:       3/21/2025 8:48 AM  DISCH DATE:        3/25/2025 5:20 PM  RESPONDING PROVIDER #:        [id number]          PROVIDER RESPONSE TEXT:    Sepsis was a differential diagnosis and ruled out after study    CDI QUERY TEXT:    Clarification            Instruction:  Based on your assessment of the patient and the clinical information, please provide the requested documentation by clicking on the appropriate radio button and enter any additional information if prompted.    Question: Sepsis was documented in the medical record. Based on the documentation and the clinical information, can the diagnosis be further clarified as    When answering this query, please exercise your independent professional judgment. The fact that a question is being asked, does not imply that any particular answer is desired or expected.    The patient's clinical indicators include:  Clinical Information:  3/21 Louisa Scanlon is a 74 y.o. who presents via EMS with complaint of weakness and potential urinary tract infection.  Recently (a few days prior)admitted to hospital for UTI.  Treated for UTI and currently being treated for TB at [facility]    Documented Diagnosis:  3/21 ED PN:  \"1. Pyelonephritis  2. Sepsis without acute organ dysfunction, due to unspecified organism (Multi)\"    Clinical Indicators:  -Vital Signs:  97/102/94/93/108/122/91/89  -WBC:   2.3/2.4/2.8/2.3  -Microbiology Results:  BC:  No Growth 4 days;  Urine culture:  Clinically Insig growth  -Band Neutrophil Count/percent Band Neutrophil:  abs neutrophils:  3.85/1.43/1.27  -Lactic acid: Not Performed  -BUN/Creat:  0.71/0.65/0.64/0.64  -Bilirubin:  0.4 etc  -MAP:  Not Performed  -Leonardo Coma Scale:  Not performed  -PAO2/FIO2:  none  -Procalcitonin:  Not performed  -Platelets:  193, etc  -Other clinical indicators:  3/21 CT " "AP:  1.Interval improvement of previously seen large left effusion and  interval development of small right pleural effusion with bibasilar  infiltrates/atelectatic changes....  3.Apparent mild wall thickening of the urinary bladder, possibly due  to incomplete distention, with possible small bladder diverticula.    3/22 ID Consult:  \"Alesia's notes indicate that she has, for at least the last few weeks, had poor energy, increased sleep, poor appetite, and urinary urgency and incontinence (the latter a chronic problem).  Urinalysis was obtained on 3/18 and was notable for pyuria and the patient was started on nitrofurantoin.\"    3/23 Med PN(Jyotsna):  \"She is sleepy but easy to arouse.  Patient has been afebrile during the night.  She had an episode of agitation, started on Zyprexa.\"  \"Systemic inflammatory response syndrome  Probably secondary to UTI:    3/24 ID PN:  \"No evidence for urinary tract infection\"      Treatment:  Rocephin IV(3/21); NS 3L bolus; Septic WHITTAKER;Zosyn IV(3/22-24); Isoniazid; ID Consult; Nitrofeurontin    Risk Factors:  Age; Chronic Pleural Effusion-TB; Recent UTI  Options provided:  -- Sepsis was a differential diagnosis and ruled out after study  -- Sepsis 2/2 TB with neurological organ dysfunction of Metabolic Encephalopathy  -- Sepsis 2/2 TB with other organ dysfunction, Please specify sepsis associated organ dysfunction below  -- SIRS with TB, no organ dysfunction and sepsis ruled out  -- Other - I will add my own diagnosis  -- Refer to Clinical Documentation Reviewer    Query created by: Florencia Ron on 4/3/2025 2:08 PM      Electronically signed by:  DIDI GRADY MD 4/12/2025 2:08 PM          "